# Patient Record
Sex: MALE | Race: WHITE | Employment: PART TIME | URBAN - NONMETROPOLITAN AREA
[De-identification: names, ages, dates, MRNs, and addresses within clinical notes are randomized per-mention and may not be internally consistent; named-entity substitution may affect disease eponyms.]

---

## 2017-06-12 ENCOUNTER — HOSPITAL ENCOUNTER (EMERGENCY)
Age: 27
Discharge: HOME | End: 2017-06-12
Payer: MEDICAID

## 2017-06-12 VITALS
RESPIRATION RATE: 18 BRPM | OXYGEN SATURATION: 99 % | WEIGHT: 150 LBS | HEIGHT: 72 IN | SYSTOLIC BLOOD PRESSURE: 125 MMHG | BODY MASS INDEX: 20.32 KG/M2 | DIASTOLIC BLOOD PRESSURE: 83 MMHG | HEART RATE: 107 BPM | TEMPERATURE: 98.1 F

## 2017-06-12 DIAGNOSIS — Z76.0 ENCOUNTER FOR MEDICATION REFILL: Primary | ICD-10-CM

## 2017-06-12 DIAGNOSIS — E10.9 TYPE 1 DIABETES MELLITUS WITHOUT COMPLICATION (HCC): ICD-10-CM

## 2017-06-12 PROCEDURE — 99281 EMR DPT VST MAYX REQ PHY/QHP: CPT

## 2017-06-12 PROCEDURE — 99282 EMERGENCY DEPT VISIT SF MDM: CPT | Performed by: PHYSICIAN ASSISTANT

## 2017-06-12 RX ORDER — LAMOTRIGINE 200 MG/1
200 TABLET ORAL DAILY
COMMUNITY

## 2017-06-12 RX ORDER — DEXTROAMPHETAMINE SACCHARATE, AMPHETAMINE ASPARTATE, DEXTROAMPHETAMINE SULFATE AND AMPHETAMINE SULFATE 5; 5; 5; 5 MG/1; MG/1; MG/1; MG/1
20 TABLET ORAL DAILY
COMMUNITY

## 2017-06-12 ASSESSMENT — ENCOUNTER SYMPTOMS
ABDOMINAL PAIN: 0
VOMITING: 0
BACK PAIN: 0
SHORTNESS OF BREATH: 0
NAUSEA: 0
COUGH: 0
CONSTIPATION: 0
WHEEZING: 0
DIARRHEA: 0

## 2017-08-20 ENCOUNTER — HOSPITAL ENCOUNTER (INPATIENT)
Age: 27
LOS: 1 days | Discharge: HOME | DRG: 866 | End: 2017-08-23
Attending: EMERGENCY MEDICINE | Admitting: SURGERY
Payer: MEDICAID

## 2017-08-20 ENCOUNTER — APPOINTMENT (OUTPATIENT)
Dept: CT IMAGING | Age: 27
DRG: 866 | End: 2017-08-20
Payer: MEDICAID

## 2017-08-20 DIAGNOSIS — R10.31 RLQ ABDOMINAL PAIN: Primary | ICD-10-CM

## 2017-08-20 DIAGNOSIS — E10.9 TYPE 1 DIABETES MELLITUS WITHOUT COMPLICATION (HCC): ICD-10-CM

## 2017-08-20 LAB
ALBUMIN SERPL-MCNC: 4.8 G/DL (ref 3.5–5.2)
ALP BLD-CCNC: 87 U/L (ref 40–130)
ALT SERPL-CCNC: 17 U/L (ref 5–41)
ANION GAP SERPL CALCULATED.3IONS-SCNC: 11 MMOL/L (ref 7–19)
AST SERPL-CCNC: 26 U/L (ref 5–40)
BASOPHILS ABSOLUTE: 0 K/UL (ref 0–0.2)
BASOPHILS RELATIVE PERCENT: 0.6 % (ref 0–1)
BILIRUB SERPL-MCNC: 0.5 MG/DL (ref 0.2–1.2)
BILIRUBIN URINE: NEGATIVE
BLOOD, URINE: NEGATIVE
BUN BLDV-MCNC: 11 MG/DL (ref 6–20)
CALCIUM SERPL-MCNC: 9.6 MG/DL (ref 8.6–10)
CHLORIDE BLD-SCNC: 98 MMOL/L (ref 98–111)
CLARITY: CLEAR
CO2: 31 MMOL/L (ref 22–29)
COLOR: YELLOW
CREAT SERPL-MCNC: 0.6 MG/DL (ref 0.5–1.2)
EOSINOPHILS ABSOLUTE: 0.3 K/UL (ref 0–0.6)
EOSINOPHILS RELATIVE PERCENT: 4.1 % (ref 0–5)
GFR NON-AFRICAN AMERICAN: >60
GLUCOSE BLD-MCNC: 125 MG/DL (ref 70–99)
GLUCOSE BLD-MCNC: 127 MG/DL (ref 70–99)
GLUCOSE BLD-MCNC: 128 MG/DL (ref 74–109)
GLUCOSE BLD-MCNC: 135 MG/DL
GLUCOSE BLD-MCNC: 135 MG/DL (ref 70–99)
GLUCOSE URINE: 100 MG/DL
HBA1C MFR BLD: 6.8 %
HCT VFR BLD CALC: 49.4 % (ref 42–52)
HEMOGLOBIN: 17.3 G/DL (ref 14–18)
KETONES, URINE: NEGATIVE MG/DL
LACTIC ACID: 1.2 MG/DL (ref 0.5–1.9)
LEUKOCYTE ESTERASE, URINE: NEGATIVE
LYMPHOCYTES ABSOLUTE: 2.3 K/UL (ref 1.1–4.5)
LYMPHOCYTES RELATIVE PERCENT: 37.1 % (ref 20–40)
MCH RBC QN AUTO: 31.7 PG (ref 27–31)
MCHC RBC AUTO-ENTMCNC: 35 G/DL (ref 33–37)
MCV RBC AUTO: 90.6 FL (ref 80–94)
MONOCYTES ABSOLUTE: 0.7 K/UL (ref 0–0.9)
MONOCYTES RELATIVE PERCENT: 10.5 % (ref 0–10)
NEUTROPHILS ABSOLUTE: 2.9 K/UL (ref 1.5–7.5)
NEUTROPHILS RELATIVE PERCENT: 47.7 % (ref 50–65)
NITRITE, URINE: NEGATIVE
PDW BLD-RTO: 11.9 % (ref 11.5–14.5)
PERFORMED ON: ABNORMAL
PH UA: 6
PLATELET # BLD: 209 K/UL (ref 130–400)
PMV BLD AUTO: 9.1 FL (ref 9.4–12.4)
POTASSIUM SERPL-SCNC: 4.1 MMOL/L (ref 3.5–5)
PROTEIN UA: ABNORMAL MG/DL
RBC # BLD: 5.45 M/UL (ref 4.7–6.1)
SODIUM BLD-SCNC: 140 MMOL/L (ref 136–145)
SPECIFIC GRAVITY UA: >=1.099
TOTAL PROTEIN: 7.8 G/DL (ref 6.6–8.7)
UROBILINOGEN, URINE: 1 E.U./DL
WBC # BLD: 6.2 K/UL (ref 4.8–10.8)

## 2017-08-20 PROCEDURE — 74177 CT ABD & PELVIS W/CONTRAST: CPT

## 2017-08-20 PROCEDURE — 36415 COLL VENOUS BLD VENIPUNCTURE: CPT

## 2017-08-20 PROCEDURE — 99219 PR INITIAL OBSERVATION CARE/DAY 50 MINUTES: CPT | Performed by: SURGERY

## 2017-08-20 PROCEDURE — 96375 TX/PRO/DX INJ NEW DRUG ADDON: CPT

## 2017-08-20 PROCEDURE — 2580000003 HC RX 258: Performed by: EMERGENCY MEDICINE

## 2017-08-20 PROCEDURE — 83605 ASSAY OF LACTIC ACID: CPT

## 2017-08-20 PROCEDURE — 99285 EMERGENCY DEPT VISIT HI MDM: CPT

## 2017-08-20 PROCEDURE — 85025 COMPLETE CBC W/AUTO DIFF WBC: CPT

## 2017-08-20 PROCEDURE — 96365 THER/PROPH/DIAG IV INF INIT: CPT

## 2017-08-20 PROCEDURE — G0378 HOSPITAL OBSERVATION PER HR: HCPCS

## 2017-08-20 PROCEDURE — 6360000004 HC RX CONTRAST MEDICATION: Performed by: EMERGENCY MEDICINE

## 2017-08-20 PROCEDURE — 2580000003 HC RX 258: Performed by: SURGERY

## 2017-08-20 PROCEDURE — 6360000002 HC RX W HCPCS: Performed by: SURGERY

## 2017-08-20 PROCEDURE — 6360000002 HC RX W HCPCS: Performed by: EMERGENCY MEDICINE

## 2017-08-20 PROCEDURE — 83036 HEMOGLOBIN GLYCOSYLATED A1C: CPT

## 2017-08-20 PROCEDURE — 81003 URINALYSIS AUTO W/O SCOPE: CPT

## 2017-08-20 PROCEDURE — 80053 COMPREHEN METABOLIC PANEL: CPT

## 2017-08-20 PROCEDURE — 99284 EMERGENCY DEPT VISIT MOD MDM: CPT | Performed by: EMERGENCY MEDICINE

## 2017-08-20 PROCEDURE — 82948 REAGENT STRIP/BLOOD GLUCOSE: CPT

## 2017-08-20 RX ORDER — SODIUM CHLORIDE 9 MG/ML
INJECTION, SOLUTION INTRAVENOUS CONTINUOUS
Status: DISCONTINUED | OUTPATIENT
Start: 2017-08-20 | End: 2017-08-20

## 2017-08-20 RX ORDER — SODIUM CHLORIDE 9 MG/ML
INJECTION, SOLUTION INTRAVENOUS ONCE
Status: DISCONTINUED | OUTPATIENT
Start: 2017-08-20 | End: 2017-08-20

## 2017-08-20 RX ORDER — SODIUM CHLORIDE 0.9 % (FLUSH) 0.9 %
10 SYRINGE (ML) INJECTION EVERY 12 HOURS SCHEDULED
Status: DISCONTINUED | OUTPATIENT
Start: 2017-08-20 | End: 2017-08-23 | Stop reason: HOSPADM

## 2017-08-20 RX ORDER — SODIUM CHLORIDE, SODIUM LACTATE, POTASSIUM CHLORIDE, CALCIUM CHLORIDE 600; 310; 30; 20 MG/100ML; MG/100ML; MG/100ML; MG/100ML
INJECTION, SOLUTION INTRAVENOUS CONTINUOUS
Status: DISCONTINUED | OUTPATIENT
Start: 2017-08-20 | End: 2017-08-23 | Stop reason: HOSPADM

## 2017-08-20 RX ORDER — ACETAMINOPHEN 325 MG/1
650 TABLET ORAL EVERY 4 HOURS PRN
Status: DISCONTINUED | OUTPATIENT
Start: 2017-08-20 | End: 2017-08-23 | Stop reason: HOSPADM

## 2017-08-20 RX ORDER — KETOROLAC TROMETHAMINE 30 MG/ML
30 INJECTION, SOLUTION INTRAMUSCULAR; INTRAVENOUS ONCE
Status: COMPLETED | OUTPATIENT
Start: 2017-08-20 | End: 2017-08-20

## 2017-08-20 RX ORDER — DEXTROSE MONOHYDRATE 50 MG/ML
100 INJECTION, SOLUTION INTRAVENOUS PRN
Status: DISCONTINUED | OUTPATIENT
Start: 2017-08-20 | End: 2017-08-23 | Stop reason: HOSPADM

## 2017-08-20 RX ORDER — ONDANSETRON 2 MG/ML
4 INJECTION INTRAMUSCULAR; INTRAVENOUS EVERY 6 HOURS PRN
Status: DISCONTINUED | OUTPATIENT
Start: 2017-08-20 | End: 2017-08-23 | Stop reason: HOSPADM

## 2017-08-20 RX ORDER — NICOTINE POLACRILEX 4 MG
15 LOZENGE BUCCAL PRN
Status: DISCONTINUED | OUTPATIENT
Start: 2017-08-20 | End: 2017-08-23 | Stop reason: HOSPADM

## 2017-08-20 RX ORDER — MORPHINE SULFATE 4 MG/ML
2 INJECTION, SOLUTION INTRAMUSCULAR; INTRAVENOUS EVERY 30 MIN PRN
Status: DISCONTINUED | OUTPATIENT
Start: 2017-08-20 | End: 2017-08-23 | Stop reason: HOSPADM

## 2017-08-20 RX ORDER — DEXTROSE MONOHYDRATE 25 G/50ML
12.5 INJECTION, SOLUTION INTRAVENOUS PRN
Status: DISCONTINUED | OUTPATIENT
Start: 2017-08-20 | End: 2017-08-23 | Stop reason: HOSPADM

## 2017-08-20 RX ORDER — ONDANSETRON 2 MG/ML
4 INJECTION INTRAMUSCULAR; INTRAVENOUS ONCE
Status: COMPLETED | OUTPATIENT
Start: 2017-08-20 | End: 2017-08-20

## 2017-08-20 RX ORDER — SODIUM CHLORIDE 9 MG/ML
INJECTION, SOLUTION INTRAVENOUS CONTINUOUS
Status: DISCONTINUED | OUTPATIENT
Start: 2017-08-20 | End: 2017-08-23 | Stop reason: HOSPADM

## 2017-08-20 RX ORDER — DEXTROAMPHETAMINE SACCHARATE, AMPHETAMINE ASPARTATE, DEXTROAMPHETAMINE SULFATE AND AMPHETAMINE SULFATE 2.5; 2.5; 2.5; 2.5 MG/1; MG/1; MG/1; MG/1
20 TABLET ORAL DAILY
Status: DISCONTINUED | OUTPATIENT
Start: 2017-08-21 | End: 2017-08-23 | Stop reason: HOSPADM

## 2017-08-20 RX ORDER — LAMOTRIGINE 200 MG/1
200 TABLET ORAL DAILY
Status: DISCONTINUED | OUTPATIENT
Start: 2017-08-21 | End: 2017-08-23 | Stop reason: HOSPADM

## 2017-08-20 RX ORDER — SODIUM CHLORIDE 0.9 % (FLUSH) 0.9 %
10 SYRINGE (ML) INJECTION PRN
Status: DISCONTINUED | OUTPATIENT
Start: 2017-08-20 | End: 2017-08-23 | Stop reason: HOSPADM

## 2017-08-20 RX ADMIN — SODIUM CHLORIDE 1000 MG: 9 INJECTION, SOLUTION INTRAVENOUS at 20:59

## 2017-08-20 RX ADMIN — SODIUM CHLORIDE: 9 INJECTION, SOLUTION INTRAVENOUS at 16:24

## 2017-08-20 RX ADMIN — IOVERSOL 90 ML: 741 INJECTION INTRA-ARTERIAL; INTRAVENOUS at 17:00

## 2017-08-20 RX ADMIN — KETOROLAC TROMETHAMINE 30 MG: 30 INJECTION, SOLUTION INTRAMUSCULAR at 17:10

## 2017-08-20 RX ADMIN — SODIUM CHLORIDE: 9 INJECTION, SOLUTION INTRAVENOUS at 19:57

## 2017-08-20 RX ADMIN — ONDANSETRON 4 MG: 2 INJECTION INTRAMUSCULAR; INTRAVENOUS at 17:10

## 2017-08-20 RX ADMIN — HYDROMORPHONE HYDROCHLORIDE 1 MG: 1 INJECTION, SOLUTION INTRAMUSCULAR; INTRAVENOUS; SUBCUTANEOUS at 21:55

## 2017-08-20 RX ADMIN — Medication 10 ML: at 19:58

## 2017-08-20 ASSESSMENT — ENCOUNTER SYMPTOMS
HEMATOCHEZIA: 1
DIARRHEA: 0
VOMITING: 1
ABDOMINAL PAIN: 1
NAUSEA: 1
CONSTIPATION: 0

## 2017-08-20 ASSESSMENT — PAIN SCALES - GENERAL
PAINLEVEL_OUTOF10: 1
PAINLEVEL_OUTOF10: 7
PAINLEVEL_OUTOF10: 6

## 2017-08-20 ASSESSMENT — PAIN DESCRIPTION - LOCATION: LOCATION: ABDOMEN

## 2017-08-20 ASSESSMENT — PAIN DESCRIPTION - DESCRIPTORS: DESCRIPTORS: ACHING

## 2017-08-21 LAB
BASOPHILS ABSOLUTE: 0.1 K/UL (ref 0–0.2)
BASOPHILS RELATIVE PERCENT: 0.9 % (ref 0–1)
EOSINOPHILS ABSOLUTE: 0.3 K/UL (ref 0–0.6)
EOSINOPHILS RELATIVE PERCENT: 5.3 % (ref 0–5)
GLUCOSE BLD-MCNC: 101 MG/DL (ref 70–99)
GLUCOSE BLD-MCNC: 118 MG/DL (ref 70–99)
GLUCOSE BLD-MCNC: 146 MG/DL (ref 70–99)
GLUCOSE BLD-MCNC: 146 MG/DL (ref 70–99)
GLUCOSE BLD-MCNC: 85 MG/DL (ref 70–99)
HCT VFR BLD CALC: 41.5 % (ref 42–52)
HEMOGLOBIN: 14.1 G/DL (ref 14–18)
LYMPHOCYTES ABSOLUTE: 2.8 K/UL (ref 1.1–4.5)
LYMPHOCYTES RELATIVE PERCENT: 53.4 % (ref 20–40)
MCH RBC QN AUTO: 31.3 PG (ref 27–31)
MCHC RBC AUTO-ENTMCNC: 34 G/DL (ref 33–37)
MCV RBC AUTO: 92 FL (ref 80–94)
MONOCYTES ABSOLUTE: 0.7 K/UL (ref 0–0.9)
MONOCYTES RELATIVE PERCENT: 13.4 % (ref 0–10)
NEUTROPHILS ABSOLUTE: 1.4 K/UL (ref 1.5–7.5)
NEUTROPHILS RELATIVE PERCENT: 26.8 % (ref 50–65)
PDW BLD-RTO: 11.9 % (ref 11.5–14.5)
PERFORMED ON: ABNORMAL
PERFORMED ON: NORMAL
PLATELET # BLD: 197 K/UL (ref 130–400)
PMV BLD AUTO: 9.5 FL (ref 9.4–12.4)
RBC # BLD: 4.51 M/UL (ref 4.7–6.1)
WBC # BLD: 5.3 K/UL (ref 4.8–10.8)

## 2017-08-21 PROCEDURE — 2580000003 HC RX 258: Performed by: SURGERY

## 2017-08-21 PROCEDURE — G0378 HOSPITAL OBSERVATION PER HR: HCPCS

## 2017-08-21 PROCEDURE — 36415 COLL VENOUS BLD VENIPUNCTURE: CPT

## 2017-08-21 PROCEDURE — 82948 REAGENT STRIP/BLOOD GLUCOSE: CPT

## 2017-08-21 PROCEDURE — 6360000002 HC RX W HCPCS: Performed by: SURGERY

## 2017-08-21 PROCEDURE — 6370000000 HC RX 637 (ALT 250 FOR IP): Performed by: SURGERY

## 2017-08-21 PROCEDURE — 99224 PR SBSQ OBSERVATION CARE/DAY 15 MINUTES: CPT | Performed by: SURGERY

## 2017-08-21 PROCEDURE — 85025 COMPLETE CBC W/AUTO DIFF WBC: CPT

## 2017-08-21 PROCEDURE — 96376 TX/PRO/DX INJ SAME DRUG ADON: CPT

## 2017-08-21 PROCEDURE — 96366 THER/PROPH/DIAG IV INF ADDON: CPT

## 2017-08-21 RX ADMIN — HYDROMORPHONE HYDROCHLORIDE 1 MG: 1 INJECTION, SOLUTION INTRAMUSCULAR; INTRAVENOUS; SUBCUTANEOUS at 08:45

## 2017-08-21 RX ADMIN — HYDROMORPHONE HYDROCHLORIDE 1 MG: 1 INJECTION, SOLUTION INTRAMUSCULAR; INTRAVENOUS; SUBCUTANEOUS at 18:50

## 2017-08-21 RX ADMIN — SODIUM CHLORIDE: 9 INJECTION, SOLUTION INTRAVENOUS at 04:10

## 2017-08-21 RX ADMIN — ACETAMINOPHEN 650 MG: 325 TABLET, FILM COATED ORAL at 22:13

## 2017-08-21 RX ADMIN — SODIUM CHLORIDE 1000 MG: 9 INJECTION, SOLUTION INTRAVENOUS at 22:03

## 2017-08-21 RX ADMIN — HYDROMORPHONE HYDROCHLORIDE 1 MG: 1 INJECTION, SOLUTION INTRAMUSCULAR; INTRAVENOUS; SUBCUTANEOUS at 04:10

## 2017-08-21 RX ADMIN — HYDROMORPHONE HYDROCHLORIDE 1 MG: 1 INJECTION, SOLUTION INTRAMUSCULAR; INTRAVENOUS; SUBCUTANEOUS at 13:03

## 2017-08-21 RX ADMIN — ONDANSETRON 4 MG: 2 INJECTION INTRAMUSCULAR; INTRAVENOUS at 08:45

## 2017-08-21 RX ADMIN — LAMOTRIGINE 200 MG: 200 TABLET ORAL at 08:54

## 2017-08-21 RX ADMIN — INSULIN LISPRO 2 UNITS: 100 INJECTION, SOLUTION INTRAVENOUS; SUBCUTANEOUS at 08:54

## 2017-08-21 RX ADMIN — DEXTROAMPHETAMINE SACCHARATE, AMPHETAMINE ASPARTATE, DEXTROAMPHETAMINE SULFATE AND AMPHETAMINE SULFATE 20 MG: 2.5; 2.5; 2.5; 2.5 TABLET ORAL at 08:54

## 2017-08-21 ASSESSMENT — PAIN SCALES - GENERAL
PAINLEVEL_OUTOF10: 9
PAINLEVEL_OUTOF10: 7
PAINLEVEL_OUTOF10: 8
PAINLEVEL_OUTOF10: 7
PAINLEVEL_OUTOF10: 5
PAINLEVEL_OUTOF10: 7
PAINLEVEL_OUTOF10: 6
PAINLEVEL_OUTOF10: 6
PAINLEVEL_OUTOF10: 5
PAINLEVEL_OUTOF10: 2

## 2017-08-22 ENCOUNTER — APPOINTMENT (OUTPATIENT)
Dept: GENERAL RADIOLOGY | Age: 27
DRG: 866 | End: 2017-08-22
Payer: MEDICAID

## 2017-08-22 LAB
GLUCOSE BLD-MCNC: 105 MG/DL (ref 70–99)
GLUCOSE BLD-MCNC: 109 MG/DL (ref 70–99)
GLUCOSE BLD-MCNC: 73 MG/DL (ref 70–99)
GLUCOSE BLD-MCNC: 79 MG/DL (ref 70–99)
GLUCOSE BLD-MCNC: 85 MG/DL (ref 70–99)
GLUCOSE BLD-MCNC: 94 MG/DL (ref 70–99)
GLUCOSE BLD-MCNC: 95 MG/DL (ref 70–99)
PERFORMED ON: ABNORMAL
PERFORMED ON: ABNORMAL
PERFORMED ON: NORMAL

## 2017-08-22 PROCEDURE — 6360000002 HC RX W HCPCS: Performed by: SURGERY

## 2017-08-22 PROCEDURE — 96366 THER/PROPH/DIAG IV INF ADDON: CPT

## 2017-08-22 PROCEDURE — 99224 PR SBSQ OBSERVATION CARE/DAY 15 MINUTES: CPT | Performed by: SURGERY

## 2017-08-22 PROCEDURE — 96376 TX/PRO/DX INJ SAME DRUG ADON: CPT

## 2017-08-22 PROCEDURE — 2580000003 HC RX 258: Performed by: SURGERY

## 2017-08-22 PROCEDURE — 74241 FL UGI WITH KUB: CPT

## 2017-08-22 PROCEDURE — 96375 TX/PRO/DX INJ NEW DRUG ADDON: CPT

## 2017-08-22 PROCEDURE — 82948 REAGENT STRIP/BLOOD GLUCOSE: CPT

## 2017-08-22 PROCEDURE — G0378 HOSPITAL OBSERVATION PER HR: HCPCS

## 2017-08-22 PROCEDURE — 6360000002 HC RX W HCPCS: Performed by: PHYSICIAN ASSISTANT

## 2017-08-22 RX ORDER — METOCLOPRAMIDE HYDROCHLORIDE 5 MG/ML
10 INJECTION INTRAMUSCULAR; INTRAVENOUS
Status: DISCONTINUED | OUTPATIENT
Start: 2017-08-22 | End: 2017-08-23 | Stop reason: HOSPADM

## 2017-08-22 RX ADMIN — HYDROMORPHONE HYDROCHLORIDE 1 MG: 1 INJECTION, SOLUTION INTRAMUSCULAR; INTRAVENOUS; SUBCUTANEOUS at 06:08

## 2017-08-22 RX ADMIN — HYDROMORPHONE HYDROCHLORIDE 1 MG: 1 INJECTION, SOLUTION INTRAMUSCULAR; INTRAVENOUS; SUBCUTANEOUS at 11:49

## 2017-08-22 RX ADMIN — METOCLOPRAMIDE 10 MG: 5 INJECTION, SOLUTION INTRAMUSCULAR; INTRAVENOUS at 17:10

## 2017-08-22 RX ADMIN — SODIUM CHLORIDE 1000 MG: 9 INJECTION, SOLUTION INTRAVENOUS at 21:15

## 2017-08-22 RX ADMIN — METOCLOPRAMIDE 10 MG: 5 INJECTION, SOLUTION INTRAMUSCULAR; INTRAVENOUS at 11:49

## 2017-08-22 ASSESSMENT — PAIN SCALES - GENERAL
PAINLEVEL_OUTOF10: 7
PAINLEVEL_OUTOF10: 8

## 2017-08-23 ENCOUNTER — APPOINTMENT (OUTPATIENT)
Dept: CT IMAGING | Age: 27
DRG: 866 | End: 2017-08-23
Payer: MEDICAID

## 2017-08-23 VITALS
RESPIRATION RATE: 18 BRPM | DIASTOLIC BLOOD PRESSURE: 57 MMHG | BODY MASS INDEX: 20.32 KG/M2 | TEMPERATURE: 97.2 F | HEIGHT: 72 IN | OXYGEN SATURATION: 99 % | HEART RATE: 74 BPM | WEIGHT: 150 LBS | SYSTOLIC BLOOD PRESSURE: 104 MMHG

## 2017-08-23 LAB
BASOPHILS ABSOLUTE: 0 K/UL (ref 0–0.2)
BASOPHILS RELATIVE PERCENT: 0.3 % (ref 0–1)
EOSINOPHILS ABSOLUTE: 0.2 K/UL (ref 0–0.6)
EOSINOPHILS RELATIVE PERCENT: 4.1 % (ref 0–5)
GLUCOSE BLD-MCNC: 173 MG/DL (ref 70–99)
GLUCOSE BLD-MCNC: 175 MG/DL (ref 70–99)
GLUCOSE BLD-MCNC: 185 MG/DL (ref 70–99)
GLUCOSE BLD-MCNC: 213 MG/DL (ref 70–99)
GLUCOSE BLD-MCNC: 58 MG/DL (ref 70–99)
GLUCOSE BLD-MCNC: 62 MG/DL (ref 70–99)
GLUCOSE BLD-MCNC: 91 MG/DL (ref 70–99)
HCT VFR BLD CALC: 38.2 % (ref 42–52)
HEMOGLOBIN: 13.9 G/DL (ref 14–18)
LYMPHOCYTES ABSOLUTE: 3 K/UL (ref 1.1–4.5)
LYMPHOCYTES RELATIVE PERCENT: 50.3 % (ref 20–40)
MCH RBC QN AUTO: 31.6 PG (ref 27–31)
MCHC RBC AUTO-ENTMCNC: 36.4 G/DL (ref 33–37)
MCV RBC AUTO: 86.8 FL (ref 80–94)
MONOCYTES ABSOLUTE: 0.7 K/UL (ref 0–0.9)
MONOCYTES RELATIVE PERCENT: 12.2 % (ref 0–10)
NEUTROPHILS ABSOLUTE: 1.9 K/UL (ref 1.5–7.5)
NEUTROPHILS RELATIVE PERCENT: 32.9 % (ref 50–65)
PDW BLD-RTO: 11.3 % (ref 11.5–14.5)
PERFORMED ON: ABNORMAL
PERFORMED ON: NORMAL
PLATELET # BLD: 187 K/UL (ref 130–400)
PMV BLD AUTO: 9.4 FL (ref 9.4–12.4)
RBC # BLD: 4.4 M/UL (ref 4.7–6.1)
WBC # BLD: 5.9 K/UL (ref 4.8–10.8)

## 2017-08-23 PROCEDURE — 1210000000 HC MED SURG R&B

## 2017-08-23 PROCEDURE — 82948 REAGENT STRIP/BLOOD GLUCOSE: CPT

## 2017-08-23 PROCEDURE — 85025 COMPLETE CBC W/AUTO DIFF WBC: CPT

## 2017-08-23 PROCEDURE — 2580000003 HC RX 258: Performed by: SURGERY

## 2017-08-23 PROCEDURE — 6360000002 HC RX W HCPCS: Performed by: PHYSICIAN ASSISTANT

## 2017-08-23 PROCEDURE — 99217 PR OBSERVATION CARE DISCHARGE MANAGEMENT: CPT | Performed by: SURGERY

## 2017-08-23 PROCEDURE — 96376 TX/PRO/DX INJ SAME DRUG ADON: CPT

## 2017-08-23 PROCEDURE — 6360000002 HC RX W HCPCS: Performed by: SURGERY

## 2017-08-23 PROCEDURE — 36415 COLL VENOUS BLD VENIPUNCTURE: CPT

## 2017-08-23 PROCEDURE — 6370000000 HC RX 637 (ALT 250 FOR IP): Performed by: SURGERY

## 2017-08-23 RX ORDER — METOCLOPRAMIDE 10 MG/1
10 TABLET ORAL
Qty: 120 TABLET | Refills: 3 | Status: SHIPPED | OUTPATIENT
Start: 2017-08-23

## 2017-08-23 RX ADMIN — METOCLOPRAMIDE 10 MG: 5 INJECTION, SOLUTION INTRAMUSCULAR; INTRAVENOUS at 06:01

## 2017-08-23 RX ADMIN — DEXTROAMPHETAMINE SACCHARATE, AMPHETAMINE ASPARTATE, DEXTROAMPHETAMINE SULFATE AND AMPHETAMINE SULFATE 20 MG: 2.5; 2.5; 2.5; 2.5 TABLET ORAL at 10:48

## 2017-08-23 RX ADMIN — LAMOTRIGINE 200 MG: 200 TABLET ORAL at 10:48

## 2017-08-23 RX ADMIN — METOCLOPRAMIDE 10 MG: 5 INJECTION, SOLUTION INTRAMUSCULAR; INTRAVENOUS at 12:04

## 2017-08-23 RX ADMIN — DEXTROSE 15 G: 15 GEL ORAL at 03:33

## 2017-08-23 RX ADMIN — SODIUM CHLORIDE: 9 INJECTION, SOLUTION INTRAVENOUS at 10:49

## 2017-08-23 RX ADMIN — HYDROMORPHONE HYDROCHLORIDE 1 MG: 1 INJECTION, SOLUTION INTRAMUSCULAR; INTRAVENOUS; SUBCUTANEOUS at 10:55

## 2017-08-23 ASSESSMENT — PAIN SCALES - GENERAL
PAINLEVEL_OUTOF10: 3
PAINLEVEL_OUTOF10: 7

## 2017-08-28 ENCOUNTER — TELEPHONE (OUTPATIENT)
Dept: SURGERY | Age: 27
End: 2017-08-28

## 2017-08-28 NOTE — TELEPHONE ENCOUNTER
pt's mother advises it's between 7 & 8 a.m. she said if he could not meet with him if you could please call her at    (Copied from [employer] message - I'll text  [facility] too - just an fyi. ..)  Thank you!!

## 2018-01-23 ENCOUNTER — APPOINTMENT (OUTPATIENT)
Dept: LAB | Facility: HOSPITAL | Age: 28
End: 2018-01-23

## 2018-01-23 ENCOUNTER — TRANSCRIBE ORDERS (OUTPATIENT)
Dept: ADMINISTRATIVE | Facility: HOSPITAL | Age: 28
End: 2018-01-23

## 2018-01-23 DIAGNOSIS — R50.9 FEVER, UNSPECIFIED FEVER CAUSE: Primary | ICD-10-CM

## 2018-01-23 LAB
ACETONE BLD QL: ABNORMAL
ALBUMIN SERPL-MCNC: 4.9 G/DL (ref 3.5–5)
ALBUMIN/GLOB SERPL: 1.7 G/DL (ref 1.1–2.5)
ALP SERPL-CCNC: 121 U/L (ref 24–120)
ALT SERPL W P-5'-P-CCNC: 36 U/L (ref 0–54)
ANION GAP SERPL CALCULATED.3IONS-SCNC: 17 MMOL/L (ref 4–13)
AST SERPL-CCNC: 38 U/L (ref 7–45)
BASOPHILS # BLD AUTO: 0.03 10*3/MM3 (ref 0–0.2)
BASOPHILS NFR BLD AUTO: 0.3 % (ref 0–2)
BILIRUB SERPL-MCNC: 1 MG/DL (ref 0.1–1)
BUN BLD-MCNC: 12 MG/DL (ref 5–21)
BUN/CREAT SERPL: 16.9 (ref 7–25)
CALCIUM SPEC-SCNC: 9.5 MG/DL (ref 8.4–10.4)
CHLORIDE SERPL-SCNC: 96 MMOL/L (ref 98–110)
CO2 SERPL-SCNC: 25 MMOL/L (ref 24–31)
CREAT BLD-MCNC: 0.71 MG/DL (ref 0.5–1.4)
DEPRECATED RDW RBC AUTO: 38.3 FL (ref 40–54)
EOSINOPHIL # BLD AUTO: 0.03 10*3/MM3 (ref 0–0.7)
EOSINOPHIL NFR BLD AUTO: 0.3 % (ref 0–4)
ERYTHROCYTE [DISTWIDTH] IN BLOOD BY AUTOMATED COUNT: 12.2 % (ref 12–15)
GFR SERPL CREATININE-BSD FRML MDRD: 133 ML/MIN/1.73
GLOBULIN UR ELPH-MCNC: 2.9 GM/DL
GLUCOSE BLD-MCNC: 200 MG/DL (ref 70–100)
HCT VFR BLD AUTO: 44.6 % (ref 40–52)
HGB BLD-MCNC: 15.8 G/DL (ref 14–18)
IMM GRANULOCYTES # BLD: 0.01 10*3/MM3 (ref 0–0.03)
IMM GRANULOCYTES NFR BLD: 0.1 % (ref 0–5)
LYMPHOCYTES # BLD AUTO: 3.39 10*3/MM3 (ref 0.72–4.86)
LYMPHOCYTES NFR BLD AUTO: 35.8 % (ref 15–45)
MCH RBC QN AUTO: 30.6 PG (ref 28–32)
MCHC RBC AUTO-ENTMCNC: 35.4 G/DL (ref 33–36)
MCV RBC AUTO: 86.3 FL (ref 82–95)
MONOCYTES # BLD AUTO: 0.83 10*3/MM3 (ref 0.19–1.3)
MONOCYTES NFR BLD AUTO: 8.8 % (ref 4–12)
NEUTROPHILS # BLD AUTO: 5.19 10*3/MM3 (ref 1.87–8.4)
NEUTROPHILS NFR BLD AUTO: 54.7 % (ref 39–78)
NRBC BLD MANUAL-RTO: 0 /100 WBC (ref 0–0)
PLATELET # BLD AUTO: 310 10*3/MM3 (ref 130–400)
PMV BLD AUTO: 9.4 FL (ref 6–12)
POTASSIUM BLD-SCNC: 4 MMOL/L (ref 3.5–5.3)
PROT SERPL-MCNC: 7.8 G/DL (ref 6.3–8.7)
RBC # BLD AUTO: 5.17 10*6/MM3 (ref 4.8–5.9)
SODIUM BLD-SCNC: 138 MMOL/L (ref 135–145)
WBC NRBC COR # BLD: 9.48 10*3/MM3 (ref 4.8–10.8)

## 2018-01-23 PROCEDURE — 80053 COMPREHEN METABOLIC PANEL: CPT | Performed by: NURSE PRACTITIONER

## 2018-01-23 PROCEDURE — 36415 COLL VENOUS BLD VENIPUNCTURE: CPT | Performed by: NURSE PRACTITIONER

## 2018-01-23 PROCEDURE — 85025 COMPLETE CBC W/AUTO DIFF WBC: CPT | Performed by: NURSE PRACTITIONER

## 2018-01-23 PROCEDURE — 82009 KETONE BODYS QUAL: CPT | Performed by: NURSE PRACTITIONER

## 2018-12-07 ENCOUNTER — OFFICE VISIT (OUTPATIENT)
Dept: ENDOCRINOLOGY | Facility: CLINIC | Age: 28
End: 2018-12-07

## 2018-12-07 VITALS
OXYGEN SATURATION: 100 % | WEIGHT: 160.6 LBS | BODY MASS INDEX: 21.75 KG/M2 | HEART RATE: 66 BPM | HEIGHT: 72 IN | SYSTOLIC BLOOD PRESSURE: 122 MMHG | DIASTOLIC BLOOD PRESSURE: 70 MMHG

## 2018-12-07 DIAGNOSIS — E53.8 B12 DEFICIENCY: ICD-10-CM

## 2018-12-07 DIAGNOSIS — E55.9 VITAMIN D DEFICIENCY: ICD-10-CM

## 2018-12-07 DIAGNOSIS — E10.649 TYPE 1 DIABETES MELLITUS WITH HYPOGLYCEMIA UNAWARENESS (HCC): Primary | ICD-10-CM

## 2018-12-07 PROBLEM — F31.9 BIPOLAR DEPRESSION (HCC): Status: ACTIVE | Noted: 2018-12-07

## 2018-12-07 PROCEDURE — 99204 OFFICE O/P NEW MOD 45 MIN: CPT | Performed by: INTERNAL MEDICINE

## 2018-12-07 RX ORDER — DEXTROAMPHETAMINE SACCHARATE, AMPHETAMINE ASPARTATE, DEXTROAMPHETAMINE SULFATE AND AMPHETAMINE SULFATE 7.5; 7.5; 7.5; 7.5 MG/1; MG/1; MG/1; MG/1
30 TABLET ORAL 2 TIMES DAILY
COMMUNITY

## 2018-12-07 RX ORDER — LAMOTRIGINE 200 MG/1
200 TABLET ORAL DAILY
COMMUNITY

## 2018-12-07 RX ORDER — GABAPENTIN 100 MG/1
200 CAPSULE ORAL 2 TIMES DAILY
COMMUNITY
End: 2021-08-30

## 2018-12-07 RX ORDER — INSULIN GLARGINE 100 [IU]/ML
30 INJECTION, SOLUTION SUBCUTANEOUS NIGHTLY
COMMUNITY
End: 2021-08-30

## 2018-12-07 RX ORDER — CLONAZEPAM 0.5 MG/1
0.5 TABLET ORAL 2 TIMES DAILY PRN
COMMUNITY

## 2018-12-07 NOTE — PROGRESS NOTES
" Jesse Woodson is a 28 y.o. male who presents for  evaluation of   Chief Complaint   Patient presents with   • Diabetes     bs 185       Referring provider    Abhi Alejo DO  [address] [place] [address]    Primary Care Provider    Abhi Alejo DO    Duration since age 8 years    Timing - Diabetes is Constant    Quality -  needs improvement    Severity -  severe    Complications - peripheral vascular disease    Current symptoms/problems DKA and Hypoglycemia Unawareness    Alleviating Factors: Compliance       Current diet  in general, a \"healthy\" diet      Current exercise walking    Current monitoring regimen: home blood tests - checking 4 x daily   Home blood sugar records:     Hypoglycemia unawareness, nocturnal, exertional , postprandial, if skipped meals , has had syncope  and has had ambulance and/or ER visits    Past Medical History:   Diagnosis Date   • Bipolar depression (CMS/[facility]) 12/7/2018   • Type 1 diabetes mellitus with hypoglycemia unawareness (CMS/[facility]) 12/7/2018     Family History   Problem Relation Age of Onset   • Diabetes type II Maternal Grandmother      Social History     Tobacco Use   • Smoking status: Current Some Day Smoker   • Smokeless tobacco: Never Used   Substance Use Topics   • Alcohol use: No     Frequency: Never   • Drug use: No         Current Outpatient Medications:   •  amphetamine-dextroamphetamine (ADDERALL) 20 MG tablet, Take 20 mg by mouth Daily., Disp: , Rfl:   •  clonazePAM (KlonoPIN) 0.5 MG tablet, Take 0.5 mg by mouth 2 (Two) Times a Day As Needed for Seizures., Disp: , Rfl:   •  gabapentin (NEURONTIN) 100 MG capsule, Take 200 mg by mouth 2 (Two) Times a Day., Disp: , Rfl:   •  Insulin Glargine (BASAGLAR KWIKPEN) 100 UNIT/ML injection pen, Inject 30 Units under the skin into the appropriate area as directed Every Night., Disp: , Rfl:   •  Insulin Lispro (HUMALOG KWIKPEN) 100 UNIT/ML solution pen-injector, Inject  under the skin into the " "appropriate area as directed. Up to 20 units per meal 3 times daily, Disp: , Rfl:   •  lamoTRIgine (LaMICtal) 100 MG tablet, Take 200 mg by mouth Daily., Disp: , Rfl:     Review of Systems    Review of Systems   Constitutional: Negative for activity change, appetite change, chills, diaphoresis, fatigue, fever and unexpected weight change.   HENT: Negative for congestion, dental problem, drooling, ear discharge, ear pain, facial swelling, mouth sores, postnasal drip, rhinorrhea, sinus pressure, sore throat, tinnitus, trouble swallowing and voice change.    Eyes: Negative for photophobia, pain, discharge, redness, itching and visual disturbance.   Respiratory: Negative for apnea, cough, choking, chest tightness, shortness of breath, wheezing and stridor.    Cardiovascular: Negative for chest pain, palpitations and leg swelling.   Gastrointestinal: Negative for abdominal distention, abdominal pain, constipation, diarrhea, nausea and vomiting.   Endocrine: Negative for cold intolerance, heat intolerance, polydipsia, polyphagia and polyuria.   Genitourinary: Negative for decreased urine volume, difficulty urinating, dysuria, flank pain, frequency, hematuria and urgency.   Musculoskeletal: Negative for arthralgias, back pain, gait problem, joint swelling, myalgias, neck pain and neck stiffness.   Skin: Negative for color change, pallor, rash and wound.   Allergic/Immunologic: Negative for immunocompromised state.   Neurological: Negative for dizziness, tremors, seizures, syncope, facial asymmetry, speech difficulty, weakness, light-headedness, numbness and headaches.   Hematological: Negative for adenopathy.   Psychiatric/Behavioral: Negative for agitation, behavioral problems, confusion, decreased concentration, dysphoric mood, hallucinations, self-injury, sleep disturbance and suicidal ideas. The patient is not nervous/anxious and is not hyperactive.         Objective:   /70   Pulse 66   Ht 182.9 cm (72\")   Wt " 72.8 kg (160 lb 9.6 oz)   SpO2 100%   BMI 21.78 kg/m²     Physical Exam   Constitutional: He is oriented to person, place, and time. He appears well-developed and well-nourished. He is cooperative.   HENT:   Head: Normocephalic and atraumatic.   Right Ear: External ear normal.   Left Ear: External ear normal.   Nose: Nose normal.   Mouth/Throat: Oropharynx is clear and moist. No oropharyngeal exudate.   Eyes: Conjunctivae and EOM are normal. Pupils are equal, round, and reactive to light. No scleral icterus. Right eye exhibits normal extraocular motion. Left eye exhibits normal extraocular motion.   Neck: Neck supple. No JVD present. No muscular tenderness present. No tracheal deviation, no edema and no erythema present. No thyromegaly present.   Cardiovascular: Normal rate, regular rhythm, normal heart sounds and intact distal pulses. Exam reveals no gallop and no friction rub.   No murmur heard.  Pulmonary/Chest: Effort normal and breath sounds normal. No stridor. No respiratory distress. He has no decreased breath sounds. He has no wheezes. He has no rhonchi. He has no rales. He exhibits no tenderness.   Abdominal: Soft. Bowel sounds are normal. He exhibits no distension and no mass. There is no hepatomegaly. There is no tenderness. There is no rebound and no guarding. No hernia.   Musculoskeletal: Normal range of motion. He exhibits no edema, tenderness or deformity.   Lymphadenopathy:     He has no cervical adenopathy.   Neurological: He is alert and oriented to person, place, and time. He has normal reflexes. No cranial nerve deficit. He exhibits normal muscle tone. Coordination normal.   Skin: Skin is warm. No rash noted. No erythema. No pallor.   Psychiatric: He has a normal mood and affect. His behavior is normal. Judgment and thought content normal.   Nursing note and vitals reviewed.      Lab Review    Results for orders placed or performed in visit on 12/07/18   CBC Auto Differential   Result Value Ref  Range    WBC 5.70 4.80 - 10.80 10*3/mm3    RBC 5.33 4.80 - 5.90 10*6/mm3    Hemoglobin 16.1 14.0 - 18.0 g/dL    Hematocrit 44.8 40.0 - 52.0 %    MCV 84.1 82.0 - 95.0 fL    MCH 30.2 28.0 - 32.0 pg    MCHC 35.9 33.0 - 36.0 g/dL    RDW 11.9 (L) 12.0 - 15.0 %    RDW-SD 35.9 (L) 40.0 - 54.0 fl    MPV 9.7 6.0 - 12.0 fL    Platelets 248 130 - 400 10*3/mm3    Neutrophil % 39.9 39.0 - 78.0 %    Lymphocyte % 49.1 (H) 15.0 - 45.0 %    Monocyte % 8.9 4.0 - 12.0 %    Eosinophil % 1.2 0.0 - 4.0 %    Basophil % 0.7 0.0 - 2.0 %    Immature Grans % 0.2 0.0 - 5.0 %    Neutrophils, Absolute 2.27 1.87 - 8.40 10*3/mm3    Lymphocytes, Absolute 2.80 0.72 - 4.86 10*3/mm3    Monocytes, Absolute 0.51 0.19 - 1.30 10*3/mm3    Eosinophils, Absolute 0.07 0.00 - 0.70 10*3/mm3    Basophils, Absolute 0.04 0.00 - 0.20 10*3/mm3    Immature Grans, Absolute 0.01 0.00 - 0.03 10*3/mm3    nRBC 0.0 0.0 - 0.0 /100 WBC   Celiac Panel Reflex To Titer   Result Value Ref Range    Gliadin Deamidated Peptide Ab, IgA 4 0 - 19 units    Tissue Transglutaminase IgA <2 0 - 3 U/mL    IgA 168 90 - 386 mg/dL   C-Peptide   Result Value Ref Range    C-Peptide <0.1 (L) 1.1 - 4.4 ng/mL   Glutamic Acid Decarboxylase   Result Value Ref Range    SALVADOR-65 11.7 (H) 0.0 - 5.0 U/mL   Hemoglobin A1c   Result Value Ref Range    Hemoglobin A1C 8.0 %   Lipid Panel   Result Value Ref Range    Total Cholesterol 150 130 - 200 mg/dL    Triglycerides 51 0 - 149 mg/dL    HDL Cholesterol 51 >=40 mg/dL    LDL Cholesterol  96 0 - 99 mg/dL    LDL/HDL Ratio 1.74    Microalbumin / Creatinine Urine Ratio - Urine, Clean Catch   Result Value Ref Range    Creatinine, Urine 15.7 Not Estab. mg/dL    Microalbumin, Urine <3.0 Not Estab. ug/mL    Microalbumin/Creatinine Ratio <19.1 0.0 - 30.0 mg/g creat   TSH   Result Value Ref Range    TSH 0.947 0.470 - 4.680 mIU/mL   Vitamin B12   Result Value Ref Range    Vitamin B-12 769 239 - 931 pg/mL   Vitamin D 25 Hydroxy   Result Value Ref Range    25 Hydroxy,  Vitamin D 33.4 30.0 - 100.0 ng/ml         Assessment/Plan       ICD-10-CM ICD-9-CM   1. Type 1 diabetes mellitus with hypoglycemia unawareness (CMS/[facility]) E10.649 250.81   2. Vitamin D deficiency E55.9 268.9   3. B12 deficiency E53.8 266.2         I reviewed and summarized records from Abhi Alejo DO from 2018 and I reviewed / ordered labs.   From review of records :    Pt has type 1 diabetes with hypo and hyperglycemia       Glycemic Management:   Lab Results   Component Value Date    HGBA1C 8.0 12/11/2018     Lab Results   Component Value Date    GLUCOSE 200 (H) 01/23/2018    BUN 12 01/23/2018    CREATININE 0.71 01/23/2018    EGFRIFNONA 133 01/23/2018    BCR 16.9 01/23/2018    K 4.0 01/23/2018    CO2 25.0 01/23/2018    CALCIUM 9.5 01/23/2018    ALBUMIN 4.90 01/23/2018    AST 38 01/23/2018    ALT 36 01/23/2018    ANIONGAP 17.0 (H) 01/23/2018     Lab Results   Component Value Date    WBC 5.70 12/11/2018    HGB 16.1 12/11/2018    HCT 44.8 12/11/2018    MCV 84.1 12/11/2018     12/11/2018     Basaglar 30 units qhs - change to 27    Humalog 15 -20 ( 1unit per 8 grams )      Approve for [place] [facility] 670 G      #1  Patient has diabetes mellitus, insulin-dependent.    #2 He performs blood glucose testing at least times daily and blood glucose log was brought to office with variability from     #3  He is requiring  Basal insulin  and Prandial Insulin for a total of at least  4 injections per day and has been doing this for at least 6 months.     #4 Patient tests blood sugars at least 4 times daily and makes frequent self-adjustments and patient is injecting insulin at least 4 times daily. He has been doing this for more than 6 months . He tests frequently due to hypoglycemia and hyperglycemia.     #5 I have personally seen patient within the past 6 months    #6 We plan on seeing her every 2-3 months for continuous adjustment of her diabetes regimen     #7 patient has hypoglycemia with episodes of  unawareness.    #8 patient has day-to-day variation in her mealtime which confounds the degree of insulin dosing with multiple daily injections.    #9 patient has completed diabetes education program with us.    #10 He has demonstrated the ability to self monitor her glucose.        #11 Patient is motivated in improving  diabetes control         Lipid Management  Lab Results   Component Value Date    CHOL 150 12/11/2018     Lab Results   Component Value Date    TRIG 51 12/11/2018     Lab Results   Component Value Date    HDL 51 12/11/2018     No components found for: LDLCALC  Lab Results   Component Value Date    LDL 96 12/11/2018     No results found for: LDLDIRECT    At goal without statin     Blood Pressure Management:    Vitals:    12/07/18 1300   BP: 122/70   Pulse: 66   SpO2: 100%     Lab Results   Component Value Date    GLUCOSE 200 (H) 01/23/2018    CALCIUM 9.5 01/23/2018     01/23/2018    K 4.0 01/23/2018    CO2 25.0 01/23/2018    CL 96 (L) 01/23/2018    BUN 12 01/23/2018    CREATININE 0.71 01/23/2018    EGFRIFNONA 133 01/23/2018    BCR 16.9 01/23/2018    ANIONGAP 17.0 (H) 01/23/2018       Nl         Microvascular Complication Monitoring:      Eye Exam Evaluation, needs referral     -----------    Last Microalbumin-Proteinuria Assessment    Lab Results   Component Value Date    MALBCRERATIO <19.1 12/11/2018       No results found for: UTPCR    -----------      Neuropathy, not from diabetes    Has carpal tunnel right hand not amenable to surgery     Immunizations:          Preventive Care:      I advised Jesse of the risks of continuing to use tobacco, and I provided him with tobacco cessation educational materials in the After Visit Summary.     During this visit, I spent 3 minutes counseling the patient regarding tobacco cessation.    Weight Related:   Wt Readings from Last 3 Encounters:   12/07/18 72.8 kg (160 lb 9.6 oz)     Body mass index is 21.78 kg/m².        Diet interventions: moderate (500  kCal/d) deficit diet.      Bone Health    Lab Results   Component Value Date    CALCIUM 9.5 01/23/2018    [id number] 33.4 12/11/2018       Thyroid Health    Lab Results   Component Value Date    TSH 0.947 12/11/2018              Other Diabetes Related Aspects       Lab Results   Component Value Date    [id number] 769 12/11/2018           Last celiac panel     Lab Results   Component Value Date    GDPABIGA 4 12/11/2018    TTRANSGLIGA <2 12/11/2018     12/11/2018         Orders Placed This Encounter   Procedures   • CBC Auto Differential   • Celiac Panel Reflex To Titer   • C-Peptide   • Glutamic Acid Decarboxylase   • Hemoglobin A1c   • Lipid Panel   • Microalbumin / Creatinine Urine Ratio - Urine, Clean Catch   • TSH   • Vitamin B12   • Vitamin D 25 Hydroxy         A copy of my note was sent to Abhi Alejo, DO    Please see my above opinion and suggestions.

## 2018-12-11 ENCOUNTER — APPOINTMENT (OUTPATIENT)
Dept: LAB | Facility: HOSPITAL | Age: 28
End: 2018-12-11

## 2018-12-11 LAB
25(OH)D3 SERPL-MCNC: 33.4 NG/ML (ref 30–100)
ARTICHOKE IGE QN: 96 MG/DL (ref 0–99)
BASOPHILS # BLD AUTO: 0.04 10*3/MM3 (ref 0–0.2)
BASOPHILS NFR BLD AUTO: 0.7 % (ref 0–2)
CHOLEST SERPL-MCNC: 150 MG/DL (ref 130–200)
DEPRECATED RDW RBC AUTO: 35.9 FL (ref 40–54)
EOSINOPHIL # BLD AUTO: 0.07 10*3/MM3 (ref 0–0.7)
EOSINOPHIL NFR BLD AUTO: 1.2 % (ref 0–4)
ERYTHROCYTE [DISTWIDTH] IN BLOOD BY AUTOMATED COUNT: 11.9 % (ref 12–15)
HBA1C MFR BLD: 8 %
HCT VFR BLD AUTO: 44.8 % (ref 40–52)
HDLC SERPL-MCNC: 51 MG/DL
HGB BLD-MCNC: 16.1 G/DL (ref 14–18)
IMM GRANULOCYTES # BLD: 0.01 10*3/MM3 (ref 0–0.03)
IMM GRANULOCYTES NFR BLD: 0.2 % (ref 0–5)
LDLC/HDLC SERPL: 1.74 {RATIO}
LYMPHOCYTES # BLD AUTO: 2.8 10*3/MM3 (ref 0.72–4.86)
LYMPHOCYTES NFR BLD AUTO: 49.1 % (ref 15–45)
MCH RBC QN AUTO: 30.2 PG (ref 28–32)
MCHC RBC AUTO-ENTMCNC: 35.9 G/DL (ref 33–36)
MCV RBC AUTO: 84.1 FL (ref 82–95)
MONOCYTES # BLD AUTO: 0.51 10*3/MM3 (ref 0.19–1.3)
MONOCYTES NFR BLD AUTO: 8.9 % (ref 4–12)
NEUTROPHILS # BLD AUTO: 2.27 10*3/MM3 (ref 1.87–8.4)
NEUTROPHILS NFR BLD AUTO: 39.9 % (ref 39–78)
NRBC BLD MANUAL-RTO: 0 /100 WBC (ref 0–0)
PLATELET # BLD AUTO: 248 10*3/MM3 (ref 130–400)
PMV BLD AUTO: 9.7 FL (ref 6–12)
RBC # BLD AUTO: 5.33 10*6/MM3 (ref 4.8–5.9)
TRIGL SERPL-MCNC: 51 MG/DL (ref 0–149)
TSH SERPL DL<=0.05 MIU/L-ACNC: 0.95 MIU/ML (ref 0.47–4.68)
VIT B12 BLD-MCNC: 769 PG/ML (ref 239–931)
WBC NRBC COR # BLD: 5.7 10*3/MM3 (ref 4.8–10.8)

## 2018-12-11 PROCEDURE — 84443 ASSAY THYROID STIM HORMONE: CPT | Performed by: INTERNAL MEDICINE

## 2018-12-11 PROCEDURE — 82607 VITAMIN B-12: CPT | Performed by: INTERNAL MEDICINE

## 2018-12-11 PROCEDURE — 83516 IMMUNOASSAY NONANTIBODY: CPT | Performed by: INTERNAL MEDICINE

## 2018-12-11 PROCEDURE — 82784 ASSAY IGA/IGD/IGG/IGM EACH: CPT | Performed by: INTERNAL MEDICINE

## 2018-12-11 PROCEDURE — 82043 UR ALBUMIN QUANTITATIVE: CPT | Performed by: INTERNAL MEDICINE

## 2018-12-11 PROCEDURE — 80061 LIPID PANEL: CPT | Performed by: INTERNAL MEDICINE

## 2018-12-11 PROCEDURE — 82306 VITAMIN D 25 HYDROXY: CPT | Performed by: INTERNAL MEDICINE

## 2018-12-11 PROCEDURE — 86341 ISLET CELL ANTIBODY: CPT | Performed by: INTERNAL MEDICINE

## 2018-12-11 PROCEDURE — 36415 COLL VENOUS BLD VENIPUNCTURE: CPT | Performed by: INTERNAL MEDICINE

## 2018-12-11 PROCEDURE — 84681 ASSAY OF C-PEPTIDE: CPT | Performed by: INTERNAL MEDICINE

## 2018-12-11 PROCEDURE — 83036 HEMOGLOBIN GLYCOSYLATED A1C: CPT | Performed by: INTERNAL MEDICINE

## 2018-12-11 PROCEDURE — 85025 COMPLETE CBC W/AUTO DIFF WBC: CPT | Performed by: INTERNAL MEDICINE

## 2018-12-11 PROCEDURE — 82570 ASSAY OF URINE CREATININE: CPT | Performed by: INTERNAL MEDICINE

## 2018-12-12 LAB
C PEPTIDE SERPL-MCNC: <0.1 NG/ML (ref 1.1–4.4)
CREAT 24H UR-MCNC: 15.7 MG/DL
GLIADIN PEPTIDE IGA SER-ACNC: 4 UNITS (ref 0–19)
IGA SERPL-MCNC: 168 MG/DL (ref 90–386)
MICROALBUMIN UR-MCNC: <3 UG/ML
MICROALBUMIN/CREAT UR: <19.1 MG/G CREAT (ref 0–30)
TTG IGA SER-ACNC: <2 U/ML (ref 0–3)

## 2018-12-13 LAB — GAD65 AB SER-ACNC: 11.7 U/ML (ref 0–5)

## 2019-01-04 ENCOUNTER — TELEPHONE (OUTPATIENT)
Dept: FAMILY MEDICINE CLINIC | Facility: CLINIC | Age: 29
End: 2019-01-04

## 2019-01-04 NOTE — TELEPHONE ENCOUNTER
Mom Ana has called and said she needs to talk to someone. They are having trouble with the insurance and getting the pump and Anibal? Please call her at [phone]

## 2019-01-04 NOTE — TELEPHONE ENCOUNTER
Pt called back and said they are needing a referral for pump and insulin. G6 is not covered at this time, says pt has been recently hospitalized again with DKA.

## 2019-01-08 ENCOUNTER — TELEPHONE (OUTPATIENT)
Dept: ENDOCRINOLOGY | Facility: CLINIC | Age: 29
End: 2019-01-08

## 2019-01-17 ENCOUNTER — TELEPHONE (OUTPATIENT)
Dept: ENDOCRINOLOGY | Facility: CLINIC | Age: 29
End: 2019-01-17

## 2019-01-24 ENCOUNTER — TELEPHONE (OUTPATIENT)
Dept: FAMILY MEDICINE CLINIC | Facility: CLINIC | Age: 29
End: 2019-01-24

## 2019-03-21 ENCOUNTER — OFFICE VISIT (OUTPATIENT)
Dept: ENDOCRINOLOGY | Facility: CLINIC | Age: 29
End: 2019-03-21

## 2019-03-21 VITALS
HEIGHT: 72 IN | WEIGHT: 164 LBS | SYSTOLIC BLOOD PRESSURE: 136 MMHG | HEART RATE: 66 BPM | DIASTOLIC BLOOD PRESSURE: 80 MMHG | BODY MASS INDEX: 22.21 KG/M2

## 2019-03-21 DIAGNOSIS — E10.649 TYPE 1 DIABETES MELLITUS WITH HYPOGLYCEMIA UNAWARENESS (HCC): Primary | ICD-10-CM

## 2019-03-21 DIAGNOSIS — F17.200 SMOKING: ICD-10-CM

## 2019-03-21 PROBLEM — IMO0001 SMOKING: Status: ACTIVE | Noted: 2019-03-21

## 2019-03-21 PROCEDURE — 99214 OFFICE O/P EST MOD 30 MIN: CPT | Performed by: NURSE PRACTITIONER

## 2019-03-21 NOTE — PROGRESS NOTES
"  Subjective    Jesse Woodson is a 28 y.o. male. he is here today for follow-up.    History of Present Illness               Primary Care Provider     Abhi Alejo,      Duration since age 8 years     Timing - Diabetes is Constant     Quality -  needs improvement     Severity -  severe     Complications - peripheral vascular disease     Current symptoms/problems DKA and Hypoglycemia Unawareness     Alleviating Factors: Compliance       Current diet  in general, a \"healthy\" diet       Current exercise walking     Current monitoring regimen: home blood tests - checking 4 x daily     Lab Results   Component Value Date    HGBA1C 8.0 12/11/2018       Home blood sugar records:     On tandem insulin pump    Most at goal     Having lows after lunch          Hypoglycemia unawareness, nocturnal, exertional , postprandial, if skipped meals , has had syncope  and has had ambulance and/or ER visits       The following portions of the patient's history were reviewed and updated as appropriate:   Past Medical History:   Diagnosis Date   • Bipolar depression (CMS/[facility]) 12/7/2018   • Type 1 diabetes mellitus with hypoglycemia unawareness (CMS/[facility]) 12/7/2018     No past surgical history on file.  Family History   Problem Relation Age of Onset   • Diabetes type II Maternal Grandmother        Current Outpatient Medications   Medication Sig Dispense Refill   • amphetamine-dextroamphetamine (ADDERALL) 20 MG tablet Take 20 mg by mouth Daily.     • clonazePAM (KlonoPIN) 0.5 MG tablet Take 0.5 mg by mouth 2 (Two) Times a Day As Needed for Seizures.     • gabapentin (NEURONTIN) 100 MG capsule Take 200 mg by mouth 2 (Two) Times a Day.     • Insulin Glargine (BASAGLAR KWIKPEN) 100 UNIT/ML injection pen Inject 30 Units under the skin into the appropriate area as directed Every Night.     • Insulin Lispro (HUMALOG KWIKPEN) 100 UNIT/ML solution pen-injector Inject  under the skin into the appropriate area as directed. Up to 20 units " per meal 3 times daily     • insulin lispro (humaLOG) 100 UNIT/ML injection Use to inject up to 100 units daily through pump 30 mL 11   • lamoTRIgine (LaMICtal) 100 MG tablet Take 200 mg by mouth Daily.       No current facility-administered medications for this visit.      No Known Allergies  Social History     Socioeconomic History   • Marital status: Single     Spouse name: Not on file   • Number of children: Not on file   • Years of education: Not on file   • Highest education level: Not on file   Tobacco Use   • Smoking status: Current Some Day Smoker   • Smokeless tobacco: Never Used   Substance and Sexual Activity   • Alcohol use: No     Frequency: Never   • Drug use: No   • Sexual activity: Defer       Review of Systems  Review of Systems   Constitutional: Negative for activity change, appetite change, diaphoresis and fatigue.   HENT: Negative for facial swelling, sneezing, sore throat, tinnitus, trouble swallowing and voice change.    Eyes: Negative for photophobia, pain, discharge, redness, itching and visual disturbance.   Respiratory: Negative for apnea, cough, choking, chest tightness and shortness of breath.    Cardiovascular: Negative for chest pain, palpitations and leg swelling.   Gastrointestinal: Negative for abdominal distention, abdominal pain, constipation, diarrhea, nausea and vomiting.   Endocrine: Negative for cold intolerance, heat intolerance, polydipsia, polyphagia and polyuria.   Genitourinary: Negative for difficulty urinating, dysuria, frequency, hematuria and urgency.   Musculoskeletal: Negative for arthralgias, back pain, gait problem, joint swelling, myalgias, neck pain and neck stiffness.   Skin: Negative for color change, pallor, rash and wound.   Neurological: Negative for dizziness, tremors, weakness, light-headedness, numbness and headaches.   Hematological: Negative for adenopathy. Does not bruise/bleed easily.   Psychiatric/Behavioral: Negative for behavioral problems,  "confusion and sleep disturbance.        Objective    /80 (BP Location: Left arm, Patient Position: Sitting, Cuff Size: Adult)   Pulse 66   Ht 182.9 cm (72\")   Wt 74.4 kg (164 lb)   BMI 22.24 kg/m²   Physical Exam   Constitutional: He is oriented to person, place, and time. He appears well-developed and well-nourished. No distress.   HENT:   Head: Normocephalic and atraumatic.   Right Ear: External ear normal.   Left Ear: External ear normal.   Nose: Nose normal.   Eyes: Conjunctivae and EOM are normal. Pupils are equal, round, and reactive to light.   Neck: Normal range of motion. Neck supple. No tracheal deviation present. No thyromegaly present.   Cardiovascular: Normal rate, regular rhythm and normal heart sounds.   No murmur heard.  Pulmonary/Chest: Effort normal and breath sounds normal. No respiratory distress. He has no wheezes.   Abdominal: Soft. Bowel sounds are normal. There is no tenderness. There is no rebound and no guarding.   Musculoskeletal: Normal range of motion. He exhibits no edema, tenderness or deformity.   Neurological: He is alert and oriented to person, place, and time. No cranial nerve deficit.   Skin: Skin is warm and dry. No rash noted.   Psychiatric: He has a normal mood and affect. His behavior is normal. Judgment and thought content normal.       Lab Review  Glucose (mg/dL)   Date Value   01/23/2018 200 (H)     Sodium (mmol/L)   Date Value   01/23/2018 138     Potassium (mmol/L)   Date Value   01/23/2018 4.0     Chloride (mmol/L)   Date Value   01/23/2018 96 (L)     CO2 (mmol/L)   Date Value   01/23/2018 25.0     BUN (mg/dL)   Date Value   01/23/2018 12     Creatinine (mg/dL)   Date Value   01/23/2018 0.71     Hemoglobin A1C (%)   Date Value   12/11/2018 8.0     Triglycerides (mg/dL)   Date Value   12/11/2018 51     LDL Cholesterol  (mg/dL)   Date Value   12/11/2018 96       Assessment/Plan      1. Type 1 diabetes mellitus with hypoglycemia unawareness (CMS/H[facility])  "   .    Medications prescribed:  Outpatient Encounter Medications as of 3/21/2019   Medication Sig Dispense Refill   • amphetamine-dextroamphetamine (ADDERALL) 20 MG tablet Take 20 mg by mouth Daily.     • clonazePAM (KlonoPIN) 0.5 MG tablet Take 0.5 mg by mouth 2 (Two) Times a Day As Needed for Seizures.     • gabapentin (NEURONTIN) 100 MG capsule Take 200 mg by mouth 2 (Two) Times a Day.     • Insulin Glargine (BASAGLAR KWIKPEN) 100 UNIT/ML injection pen Inject 30 Units under the skin into the appropriate area as directed Every Night.     • Insulin Lispro (HUMALOG KWIKPEN) 100 UNIT/ML solution pen-injector Inject  under the skin into the appropriate area as directed. Up to 20 units per meal 3 times daily     • insulin lispro (humaLOG) 100 UNIT/ML injection Use to inject up to 100 units daily through pump 30 mL 11   • lamoTRIgine (LaMICtal) 100 MG tablet Take 200 mg by mouth Daily.       No facility-administered encounter medications on file as of 3/21/2019.        Orders placed during this encounter include:  No orders of the defined types were placed in this encounter.    Pt has type 1 diabetes with hypo and hyperglycemia         Glycemic Management:       Lab Results   Component Value Date    HGBA1C 8.0 12/11/2018          Basaglar 30 units qhs - change to 27--stopped      Humalog 15 -20 ( 1unit per 8 grams )-stopped          Now on tandem insulin pump     MN - 0.980      Carb  Ratio     MN - 8  Added noon - 10  6 pm - 8     Correction     30       Target 120              Lipid Management            Lab Results   Component Value Date     LDL 96 12/11/2018           At goal without statin      Blood Pressure Management:          Normal without bp meds           Microvascular Complication Monitoring:       Eye Exam Evaluation, needs referral      -----------     Last Microalbumin-Proteinuria Assessment           Lab Results   Component Value Date     MALBCRERATIO <19.1 12/11/2018         No results found for:  UTPCR     -----------        Neuropathy, not from diabetes     Has carpal tunnel right hand not amenable to surgery      Immunizations:             Preventive Care:        I advised Jesse of the risks of continuing to use tobacco, and I provided him with tobacco cessation educational materials in the After Visit Summary.     During this visit, I spent less than 3  minutes counseling the patient regarding tobacco cessation.       Weight Related:   Patient's Body mass index is 22.24 kg/m². BMI is within normal range                  Bone Health           Lab Results   Component Value Date     CALCIUM 9.5 01/23/2018     [id number] 33.4 12/11/2018         Thyroid Health           Lab Results   Component Value Date     TSH 0.947 12/11/2018                  Other Diabetes Related Aspects               Lab Results   Component Value Date     [id number] 769 12/11/2018               Last celiac panel      Lab Results   Component Value Date     GDPABIGA 4 12/11/2018     TTRANSGLIGA <2 12/11/2018      12/11/2018            4. Follow-up: Return in about 3 months (around 6/21/2019) for Recheck.

## 2019-08-08 ENCOUNTER — OFFICE VISIT (OUTPATIENT)
Dept: ENDOCRINOLOGY | Facility: CLINIC | Age: 29
End: 2019-08-08

## 2019-08-08 ENCOUNTER — TELEPHONE (OUTPATIENT)
Dept: ENDOCRINOLOGY | Facility: CLINIC | Age: 29
End: 2019-08-08

## 2019-08-08 VITALS
HEIGHT: 72 IN | BODY MASS INDEX: 21.67 KG/M2 | DIASTOLIC BLOOD PRESSURE: 70 MMHG | HEART RATE: 77 BPM | WEIGHT: 160 LBS | SYSTOLIC BLOOD PRESSURE: 124 MMHG

## 2019-08-08 DIAGNOSIS — E10.649 TYPE 1 DIABETES MELLITUS WITH HYPOGLYCEMIA UNAWARENESS (HCC): Primary | ICD-10-CM

## 2019-08-08 DIAGNOSIS — F17.200 SMOKING: ICD-10-CM

## 2019-08-08 LAB — HBA1C MFR BLD: 7.3 %

## 2019-08-08 PROCEDURE — 99213 OFFICE O/P EST LOW 20 MIN: CPT | Performed by: NURSE PRACTITIONER

## 2019-08-08 RX ORDER — LURASIDONE HYDROCHLORIDE 20 MG/1
20 TABLET, FILM COATED ORAL DAILY
COMMUNITY
Start: 2019-08-07

## 2019-08-08 NOTE — PROGRESS NOTES
"  Subjective    Jesse Woodson is a 29 y.o. male. he is here today for follow-up.    History of Present Illness       Primary Care Provider     Abhi Alejo,      Duration since age 8 years     Timing - Diabetes is Constant     Quality -  needs improvement     Severity -  severe     Complications - peripheral vascular disease     Current symptoms/problems DKA and Hypoglycemia Unawareness     Alleviating Factors: Compliance       Current diet  in general, a \"healthy\" diet       Current exercise walking     Current monitoring regimen: home blood tests - checking 4 x daily            Lab Results   Component Value Date     HGBA1C 8.0 12/11/2018             Last HgbA1c 7.3% in April 2019       Home blood sugar records:      On tandem insulin pump     Most at goal      Having lows when at work            Hypoglycemia unawareness, nocturnal, exertional , postprandial, if skipped meals , has had syncope  and has had ambulance and/or ER visits             The following portions of the patient's history were reviewed and updated as appropriate:   Past Medical History:   Diagnosis Date   • Bipolar depression (CMS/[facility]) 12/7/2018   • Type 1 diabetes mellitus with hypoglycemia unawareness (CMS/[facility]) 12/7/2018     History reviewed. No pertinent surgical history.  Family History   Problem Relation Age of Onset   • Diabetes type II Maternal Grandmother        Current Outpatient Medications   Medication Sig Dispense Refill   • amphetamine-dextroamphetamine (ADDERALL) 20 MG tablet Take 20 mg by mouth Daily.     • clonazePAM (KlonoPIN) 0.5 MG tablet Take 0.5 mg by mouth 2 (Two) Times a Day As Needed for Seizures.     • gabapentin (NEURONTIN) 100 MG capsule Take 200 mg by mouth 2 (Two) Times a Day.     • Insulin Glargine (BASAGLAR KWIKPEN) 100 UNIT/ML injection pen Inject 30 Units under the skin into the appropriate area as directed Every Night.     • Insulin Lispro (HUMALOG KWIKPEN) 100 UNIT/ML solution pen-injector Inject "  under the skin into the appropriate area as directed. Up to 20 units per meal 3 times daily     • insulin lispro (humaLOG) 100 UNIT/ML injection Use to inject up to 100 units daily through pump 30 mL 11   • lamoTRIgine (LaMICtal) 100 MG tablet Take 200 mg by mouth Daily.     • LATUDA 20 MG tablet tablet        No current facility-administered medications for this visit.      No Known Allergies  Social History     Socioeconomic History   • Marital status: Single     Spouse name: Not on file   • Number of children: Not on file   • Years of education: Not on file   • Highest education level: Not on file   Tobacco Use   • Smoking status: Current Some Day Smoker   • Smokeless tobacco: Never Used   Substance and Sexual Activity   • Alcohol use: No     Frequency: Never   • Drug use: No   • Sexual activity: Defer       Review of Systems  Review of Systems   Constitutional: Negative for activity change, appetite change, diaphoresis and fatigue.   HENT: Negative for facial swelling, sneezing, sore throat, tinnitus, trouble swallowing and voice change.    Eyes: Negative for photophobia, pain, discharge, redness, itching and visual disturbance.   Respiratory: Negative for apnea, cough, choking, chest tightness and shortness of breath.    Cardiovascular: Negative for chest pain, palpitations and leg swelling.   Gastrointestinal: Negative for abdominal distention, abdominal pain, constipation, diarrhea, nausea and vomiting.   Endocrine: Negative for cold intolerance, heat intolerance, polydipsia, polyphagia and polyuria.   Genitourinary: Negative for difficulty urinating, dysuria, frequency, hematuria and urgency.   Musculoskeletal: Negative for arthralgias, back pain, gait problem, joint swelling, myalgias, neck pain and neck stiffness.   Skin: Negative for color change, pallor, rash and wound.   Neurological: Negative for dizziness, tremors, weakness, light-headedness, numbness and headaches.   Hematological: Negative for  "adenopathy. Does not bruise/bleed easily.   Psychiatric/Behavioral: Negative for behavioral problems, confusion and sleep disturbance.        Objective    /70 (BP Location: Right arm, Patient Position: Sitting, Cuff Size: Adult)   Pulse 77   Ht 182.9 cm (72\")   Wt 72.6 kg (160 lb)   BMI 21.70 kg/m²   Physical Exam   Constitutional: He is oriented to person, place, and time. He appears well-developed and well-nourished. No distress.   HENT:   Head: Normocephalic and atraumatic.   Right Ear: External ear normal.   Left Ear: External ear normal.   Nose: Nose normal.   Eyes: Conjunctivae and EOM are normal. Pupils are equal, round, and reactive to light.   Neck: Normal range of motion. Neck supple. No tracheal deviation present. No thyromegaly present.   Cardiovascular: Normal rate, regular rhythm and normal heart sounds.   No murmur heard.  Pulmonary/Chest: Effort normal and breath sounds normal. No respiratory distress. He has no wheezes.   Abdominal: Soft. Bowel sounds are normal. There is no tenderness. There is no rebound and no guarding.   Musculoskeletal: Normal range of motion. He exhibits no edema, tenderness or deformity.   Neurological: He is alert and oriented to person, place, and time. No cranial nerve deficit.   Skin: Skin is warm and dry. No rash noted.   Psychiatric: He has a normal mood and affect. His behavior is normal. Judgment and thought content normal.       Lab Review  Glucose (mg/dL)   Date Value   01/23/2018 200 (H)     Sodium (mmol/L)   Date Value   01/23/2018 138     Potassium (mmol/L)   Date Value   01/23/2018 4.0     Chloride (mmol/L)   Date Value   01/23/2018 96 (L)     CO2 (mmol/L)   Date Value   01/23/2018 25.0     BUN (mg/dL)   Date Value   01/23/2018 12     Creatinine (mg/dL)   Date Value   01/23/2018 0.71     Hemoglobin A1C   Date Value   04/02/2019 7.3   12/11/2018 8.0 %     Triglycerides (mg/dL)   Date Value   12/11/2018 51     LDL Cholesterol  (mg/dL)   Date Value "   12/11/2018 96       Assessment/Plan      1. Type 1 diabetes mellitus with hypoglycemia unawareness (CMS/[facility])    2. Smoking    .    Medications prescribed:  Outpatient Encounter Medications as of 8/8/2019   Medication Sig Dispense Refill   • amphetamine-dextroamphetamine (ADDERALL) 20 MG tablet Take 20 mg by mouth Daily.     • clonazePAM (KlonoPIN) 0.5 MG tablet Take 0.5 mg by mouth 2 (Two) Times a Day As Needed for Seizures.     • gabapentin (NEURONTIN) 100 MG capsule Take 200 mg by mouth 2 (Two) Times a Day.     • Insulin Glargine (BASAGLAR KWIKPEN) 100 UNIT/ML injection pen Inject 30 Units under the skin into the appropriate area as directed Every Night.     • Insulin Lispro (HUMALOG KWIKPEN) 100 UNIT/ML solution pen-injector Inject  under the skin into the appropriate area as directed. Up to 20 units per meal 3 times daily     • insulin lispro (humaLOG) 100 UNIT/ML injection Use to inject up to 100 units daily through pump 30 mL 11   • lamoTRIgine (LaMICtal) 100 MG tablet Take 200 mg by mouth Daily.     • LATUDA 20 MG tablet tablet        No facility-administered encounter medications on file as of 8/8/2019.        Orders placed during this encounter include:  Orders Placed This Encounter   Procedures   • Hemoglobin A1c     This order was created through External Result Entry   • Comprehensive Metabolic Panel     Standing Status:   Future     Standing Expiration Date:   8/8/2020   • Hemoglobin A1c     Standing Status:   Future     Standing Expiration Date:   8/8/2020   • Microalbumin / Creatinine Urine Ratio - Urine, Clean Catch     Standing Status:   Future     Standing Expiration Date:   8/8/2020   • Protein / Creatinine Ratio, Urine - Urine, Clean Catch     Standing Status:   Future     Standing Expiration Date:   8/8/2020   • TSH     Standing Status:   Future     Standing Expiration Date:   8/8/2020   • Vitamin B12     Standing Status:   Future     Standing Expiration Date:   8/8/2020   • Vitamin D 25  Hydroxy     Standing Status:   Future     Standing Expiration Date:   8/8/2020   • CBC & Differential     Standing Status:   Future     Standing Expiration Date:   8/8/2020     Order Specific Question:   Manual Differential     Answer:   No     Pt has type 1 diabetes with hypo and hyperglycemia         Glycemic Management:      Lab Results   Component Value Date    HGBA1C 7.3 04/02/2019                Basaglar 30 units qhs - change to 27--stopped      Humalog 15 -20 ( 1unit per 8 grams )-stopped           Now on tandem insulin pump      MN - 0.980        Carb  Ratio      MN - 8  Added noon - 10  6 pm - 8      Correction      30           Target 120     He will try a temp basal of 80% during work hours to prevent the low           approve dexcom       1. Patient is diabetic, insulin dependent  2. He checks his sugars 4 times daily with variability from 50 up to 400  3, He takes basal insulin and prandial insulin 3 times daily for a total of 4 injections per day  4. Based on glucose readings we make adjustments to the insulin  5. We have not achieved ideal outcomes with more information with a continuous  glucose sensor we should be able to do so  6. We see him every 2 to 3 months for diabetes management  7. Patient has hypoglycemia with unawareness  8. Patient has completed diabetes education  9. Patient has day to day variation in mealtime which confounds the degree of insulin dosing with multiple injections unless we have the CGMS that allows us to better  insulin dosing     Target 120               Lipid Management                  Lab Results   Component Value Date     LDL 96 12/11/2018            At goal without statin      Blood Pressure Management:          Normal without bp meds           Microvascular Complication Monitoring:       Eye Exam Evaluation, needs referral      -----------     Last Microalbumin-Proteinuria Assessment               Lab Results   Component Value Date     MALBCRERATIO <19.1  12/11/2018         No results found for: UTPCR     -----------        Neuropathy, not from diabetes     Has carpal tunnel right hand not amenable to surgery      Immunizations:             Preventive Care:          Smoking     Cessation discussed          Weight Related:       Patient's Body mass index is 21.7 kg/m². BMI is above normal parameters. Recommendations include: educational material.                   Bone Health               Lab Results   Component Value Date     CALCIUM 9.5 01/23/2018     [id number] 33.4 12/11/2018         Thyroid Health               Lab Results   Component Value Date     TSH 0.947 12/11/2018                  Other Diabetes Related Aspects                   Lab Results   Component Value Date     [id number] 769 12/11/2018               Last celiac panel            Lab Results   Component Value Date     GDPABIGA 4 12/11/2018     TTRANSGLIGA <2 12/11/2018      12/11/2018          4. Follow-up: Return in about 3 months (around 11/8/2019) for Recheck.

## 2019-12-26 ENCOUNTER — TELEPHONE (OUTPATIENT)
Dept: ENDOCRINOLOGY | Facility: CLINIC | Age: 29
End: 2019-12-26

## 2020-01-09 ENCOUNTER — OFFICE VISIT (OUTPATIENT)
Dept: ENDOCRINOLOGY | Facility: CLINIC | Age: 30
End: 2020-01-09

## 2020-01-09 VITALS
SYSTOLIC BLOOD PRESSURE: 126 MMHG | HEIGHT: 72 IN | DIASTOLIC BLOOD PRESSURE: 82 MMHG | OXYGEN SATURATION: 98 % | BODY MASS INDEX: 21.29 KG/M2 | WEIGHT: 157.2 LBS | HEART RATE: 68 BPM

## 2020-01-09 DIAGNOSIS — F17.200 SMOKING: ICD-10-CM

## 2020-01-09 DIAGNOSIS — E55.9 VITAMIN D DEFICIENCY: ICD-10-CM

## 2020-01-09 DIAGNOSIS — E10.649 TYPE 1 DIABETES MELLITUS WITH HYPOGLYCEMIA UNAWARENESS (HCC): Primary | ICD-10-CM

## 2020-01-09 PROCEDURE — 99214 OFFICE O/P EST MOD 30 MIN: CPT | Performed by: NURSE PRACTITIONER

## 2020-01-09 NOTE — PROGRESS NOTES
"  Subjective    Jesse Woodson is a 29 y.o. male. he is here today for follow-up.    History of Present Illness       Primary Care Provider     Abhi Alejo,      Duration since age 8 years     Timing - Diabetes is Constant     Quality -  needs improvement     Severity -  severe     Complications - peripheral vascular disease     Current symptoms/problems DKA and Hypoglycemia Unawareness     Alleviating Factors: Compliance       Current diet  in general, a \"healthy\" diet       Current exercise walking     Current monitoring regimen: home blood tests - checking 4 x daily        Lab Results   Component Value Date    HGBA1C 7.3 04/02/2019             Home blood sugar records:      On tandem insulin pump     Most at goal     Was having            Hypoglycemia unawareness, nocturnal, exertional , postprandial, if skipped meals , has had syncope  and has had ambulance and/or ER visits            The following portions of the patient's history were reviewed and updated as appropriate:   Past Medical History:   Diagnosis Date   • Bipolar depression (CMS/[facility]) 12/7/2018   • Type 1 diabetes mellitus with hypoglycemia unawareness (CMS/[facility]) 12/7/2018     History reviewed. No pertinent surgical history.  Family History   Problem Relation Age of Onset   • Diabetes type II Maternal Grandmother        Current Outpatient Medications   Medication Sig Dispense Refill   • ADMELOG 100 UNIT/ML injection USE TO INJECT 100 UNITS EVERY DAY THROUGH INSULIN PUMP 30 mL 11   • amphetamine-dextroamphetamine (ADDERALL) 20 MG tablet Take 20 mg by mouth Daily.     • clonazePAM (KlonoPIN) 0.5 MG tablet Take 0.5 mg by mouth 2 (Two) Times a Day As Needed for Seizures.     • gabapentin (NEURONTIN) 100 MG capsule Take 200 mg by mouth 2 (Two) Times a Day.     • Insulin Glargine (BASAGLAR KWIKPEN) 100 UNIT/ML injection pen Inject 30 Units under the skin into the appropriate area as directed Every Night.     • lamoTRIgine (LaMICtal) 100 MG " tablet Take 200 mg by mouth Daily.     • LATUDA 20 MG tablet tablet      • glucagon (GLUCAGON EMERGENCY) 1 MG injection Inject 1 mg under the skin into the appropriate area as directed 1 (One) Time As Needed for Low Blood Sugar for up to 1 dose. 1 kit 12     No current facility-administered medications for this visit.      No Known Allergies  Social History     Socioeconomic History   • Marital status: Single     Spouse name: Not on file   • Number of children: Not on file   • Years of education: Not on file   • Highest education level: Not on file   Tobacco Use   • Smoking status: Current Some Day Smoker   • Smokeless tobacco: Never Used   Substance and Sexual Activity   • Alcohol use: No     Frequency: Never   • Drug use: No   • Sexual activity: Defer       Review of Systems  Review of Systems   Constitutional: Negative for activity change, appetite change, diaphoresis and fatigue.   HENT: Negative for facial swelling, sneezing, sore throat, tinnitus, trouble swallowing and voice change.    Eyes: Negative for photophobia, pain, discharge, redness, itching and visual disturbance.   Respiratory: Negative for apnea, cough, choking, chest tightness and shortness of breath.    Cardiovascular: Negative for chest pain, palpitations and leg swelling.   Gastrointestinal: Negative for abdominal distention, abdominal pain, constipation, diarrhea, nausea and vomiting.   Endocrine: Negative for cold intolerance, heat intolerance, polydipsia, polyphagia and polyuria.   Genitourinary: Negative for difficulty urinating, dysuria, frequency, hematuria and urgency.   Musculoskeletal: Negative for arthralgias, back pain, gait problem, joint swelling, myalgias, neck pain and neck stiffness.   Skin: Negative for color change, pallor, rash and wound.   Neurological: Negative for dizziness, tremors, weakness, light-headedness, numbness and headaches.   Hematological: Negative for adenopathy. Does not bruise/bleed easily.  "  Psychiatric/Behavioral: Negative for behavioral problems, confusion and sleep disturbance.        Objective    /82   Pulse 68   Ht 182.9 cm (72\")   Wt 71.3 kg (157 lb 3.2 oz)   SpO2 98%   BMI 21.32 kg/m²   Physical Exam   Constitutional: He is oriented to person, place, and time. He appears well-developed and well-nourished. No distress.   HENT:   Head: Normocephalic and atraumatic.   Right Ear: External ear normal.   Left Ear: External ear normal.   Nose: Nose normal.   Eyes: Pupils are equal, round, and reactive to light. Conjunctivae and EOM are normal.   Neck: Normal range of motion. Neck supple. No tracheal deviation present. No thyromegaly present.   Cardiovascular: Normal rate, regular rhythm and normal heart sounds.   No murmur heard.  Pulmonary/Chest: Effort normal and breath sounds normal. No respiratory distress. He has no wheezes.   Abdominal: Soft. Bowel sounds are normal. There is no tenderness. There is no rebound and no guarding.   Musculoskeletal: Normal range of motion. He exhibits no edema, tenderness or deformity.   Neurological: He is alert and oriented to person, place, and time. No cranial nerve deficit.   Skin: Skin is warm and dry. No rash noted.   Psychiatric: He has a normal mood and affect. His behavior is normal. Judgment and thought content normal.       Lab Review  Glucose (mg/dL)   Date Value   01/23/2018 200 (H)     Sodium (mmol/L)   Date Value   01/23/2018 138     Potassium (mmol/L)   Date Value   01/23/2018 4.0     Chloride (mmol/L)   Date Value   01/23/2018 96 (L)     CO2 (mmol/L)   Date Value   01/23/2018 25.0     BUN (mg/dL)   Date Value   01/23/2018 12     Creatinine (mg/dL)   Date Value   01/23/2018 0.71     Hemoglobin A1C   Date Value   04/02/2019 7.3   12/11/2018 8.0 %     Triglycerides (mg/dL)   Date Value   12/11/2018 51     LDL Cholesterol  (mg/dL)   Date Value   12/11/2018 96       Assessment/Plan      1. Type 1 diabetes mellitus with hypoglycemia " unawareness (CMS/[facility])    2. Vitamin D deficiency    3. Smoking    .    Medications prescribed:  Outpatient Encounter Medications as of 1/9/2020   Medication Sig Dispense Refill   • ADMELOG 100 UNIT/ML injection USE TO INJECT 100 UNITS EVERY DAY THROUGH INSULIN PUMP 30 mL 11   • amphetamine-dextroamphetamine (ADDERALL) 20 MG tablet Take 20 mg by mouth Daily.     • clonazePAM (KlonoPIN) 0.5 MG tablet Take 0.5 mg by mouth 2 (Two) Times a Day As Needed for Seizures.     • gabapentin (NEURONTIN) 100 MG capsule Take 200 mg by mouth 2 (Two) Times a Day.     • Insulin Glargine (BASAGLAR KWIKPEN) 100 UNIT/ML injection pen Inject 30 Units under the skin into the appropriate area as directed Every Night.     • lamoTRIgine (LaMICtal) 100 MG tablet Take 200 mg by mouth Daily.     • LATUDA 20 MG tablet tablet      • [DISCONTINUED] ADMELOG 100 UNIT/ML injection USE TO INJECT 100 UNITS EVERY DAY THROUGH INSULIN PUMP 30 mL 2   • glucagon (GLUCAGON EMERGENCY) 1 MG injection Inject 1 mg under the skin into the appropriate area as directed 1 (One) Time As Needed for Low Blood Sugar for up to 1 dose. 1 kit 12     No facility-administered encounter medications on file as of 1/9/2020.        Orders placed during this encounter include:  Orders Placed This Encounter   Procedures   • Comprehensive Metabolic Panel   • Hemoglobin A1c   • Protein / Creatinine Ratio, Urine - Urine, Clean Catch   • Microalbumin / Creatinine Urine Ratio - Urine, Clean Catch   • TSH   • Vitamin D 25 Hydroxy   • CBC & Differential     Order Specific Question:   Manual Differential     Answer:   No     Pt has type 1 diabetes with hypo and hyperglycemia         Glycemic Management:            Lab Results   Component Value Date     HGBA1C 7.3 04/02/2019                  Basaglar 30 units qhs - change to 27--stopped      Humalog 15 -20 ( 1unit per 8 grams )-stopped           Now on tandem insulin pump      MN - 0.92        Carb  Ratio      MN - 8        Correction       30            Target 120      He will try a temp basal of 80% during work hours to prevent the low        Dexcom G5 -- upgrade to dexcom G6             Lipid Management                  Lab Results   Component Value Date     LDL 96 12/11/2018            At goal without statin      Blood Pressure Management:          Normal without bp meds           Microvascular Complication Monitoring:       Eye Exam Evaluation, needs referral      -----------     Last Microalbumin-Proteinuria Assessment               Lab Results   Component Value Date     MALBCRERATIO <19.1 12/11/2018         No results found for: UTPCR     -----------        Neuropathy, not from diabetes     Has carpal tunnel right hand not amenable to surgery      Immunizations:             Preventive Care:          Smoking     Jesse Woodson  reports that he has been smoking. He has never used smokeless tobacco.. I have educated him on the risk of diseases from using tobacco products such as cancer, COPD and heart diease.     I advised him to quit and he is not willing to quit.    I spent 3  minutes counseling the patient.               Weight Related:         Patient's Body mass index is 21.32 kg/m². BMI is within normal parameters. No follow-up required..    Decrease daily caloric intake by 500 calories per day                       Bone Health      Vitamin d def      Reassess         Thyroid Health      Lab Results   Component Value Date    TSH 0.947 12/11/2018                      Other Diabetes Related Aspects                   Lab Results   Component Value Date     [id number] 769 12/11/2018               Last celiac panel                Lab Results   Component Value Date     GDPABIGA 4 12/11/2018     TTRANSGLIGA <2 12/11/2018      12/11/2018            4. Follow-up: Return in about 6 months (around 7/9/2020) for Recheck.

## 2020-06-22 ENCOUNTER — TELEPHONE (OUTPATIENT)
Dept: ENDOCRINOLOGY | Facility: CLINIC | Age: 30
End: 2020-06-22

## 2020-06-22 NOTE — TELEPHONE ENCOUNTER
Faxed lab work.     Pt is wants lab orders to be faxed over to [phone]. The name of the facility is [facility]. Their phone number is [phone].

## 2020-06-22 NOTE — TELEPHONE ENCOUNTER
Pt is wants lab orders to be faxed over to [phone]. The name of the facility is [facility]. Their phone number is [phone].

## 2020-07-01 ENCOUNTER — TELEPHONE (OUTPATIENT)
Dept: ENDOCRINOLOGY | Facility: CLINIC | Age: 30
End: 2020-07-01

## 2020-07-01 NOTE — TELEPHONE ENCOUNTER
Lab orders did not come in so needs faxed asap wants to do today  to   Fax # [phone]    Believe we had wrong lab fax so she got the one they said goes straight to the lab.    Thank You

## 2020-07-09 ENCOUNTER — OFFICE VISIT (OUTPATIENT)
Dept: ENDOCRINOLOGY | Facility: CLINIC | Age: 30
End: 2020-07-09

## 2020-07-09 ENCOUNTER — TELEPHONE (OUTPATIENT)
Dept: ENDOCRINOLOGY | Facility: CLINIC | Age: 30
End: 2020-07-09

## 2020-07-09 VITALS
WEIGHT: 160.5 LBS | HEART RATE: 90 BPM | HEIGHT: 71 IN | BODY MASS INDEX: 22.47 KG/M2 | OXYGEN SATURATION: 99 % | SYSTOLIC BLOOD PRESSURE: 108 MMHG | DIASTOLIC BLOOD PRESSURE: 62 MMHG

## 2020-07-09 DIAGNOSIS — E55.9 VITAMIN D DEFICIENCY: Primary | ICD-10-CM

## 2020-07-09 DIAGNOSIS — E10.649 TYPE 1 DIABETES MELLITUS WITH HYPOGLYCEMIA UNAWARENESS (HCC): ICD-10-CM

## 2020-07-09 PROCEDURE — 99214 OFFICE O/P EST MOD 30 MIN: CPT | Performed by: NURSE PRACTITIONER

## 2020-07-09 NOTE — PROGRESS NOTES
"  Subjective    Jesse Woodson is a 30 y.o. male. he is here today for follow-up.    History of Present Illness      IN OFFICE VISIT      Primary Care Provider     Abhi Alejo,     REASON - DIABETES MELLITUS TYPE 1     Duration since age 8 years     Timing - Diabetes is Constant     Quality -  needs improvement     Severity -  severe     Complications - peripheral vascular disease     Current symptoms/problems DKA and Hypoglycemia Unawareness     Alleviating Factors: Compliance       Current diet  in general, a \"healthy\" diet       Current exercise walking     Current monitoring regimen: home blood tests - checking 4 x daily                 Lab Results   Component Value Date    HGBA1C 7.3 04/02/2019          Home blood sugar records:      On tandem insulin pump     Hypoglycemia occuring when missing meals    In office 68 -- gave juice up to 80         Hypoglycemia unawareness, nocturnal, exertional , postprandial, if skipped meals , has had syncope  and has had ambulance and/or ER visits            The following portions of the patient's history were reviewed and updated as appropriate:   Past Medical History:   Diagnosis Date   • Bipolar depression (CMS/[facility]) 12/7/2018   • Type 1 diabetes mellitus with hypoglycemia unawareness (CMS/[facility]) 12/7/2018     No past surgical history on file.  Family History   Problem Relation Age of Onset   • Diabetes type II Maternal Grandmother        Current Outpatient Medications   Medication Sig Dispense Refill   • ADMELOG 100 UNIT/ML injection USE TO INJECT 100 UNITS EVERY DAY THROUGH INSULIN PUMP 30 mL 11   • amphetamine-dextroamphetamine (ADDERALL) 20 MG tablet Take 20 mg by mouth Daily.     • clonazePAM (KlonoPIN) 0.5 MG tablet Take 0.5 mg by mouth 2 (Two) Times a Day As Needed for Seizures.     • glucagon (GLUCAGON EMERGENCY) 1 MG injection Inject 1 mg under the skin into the appropriate area as directed 1 (One) Time As Needed for Low Blood Sugar for up to 1 dose. 1 kit " 12   • lamoTRIgine (LaMICtal) 100 MG tablet Take 200 mg by mouth Daily.     • LATUDA 20 MG tablet tablet      • gabapentin (NEURONTIN) 100 MG capsule Take 200 mg by mouth 2 (Two) Times a Day.     • Insulin Glargine (BASAGLAR KWIKPEN) 100 UNIT/ML injection pen Inject 30 Units under the skin into the appropriate area as directed Every Night.       No current facility-administered medications for this visit.      No Known Allergies  Social History     Socioeconomic History   • Marital status: Single     Spouse name: Not on file   • Number of children: Not on file   • Years of education: Not on file   • Highest education level: Not on file   Tobacco Use   • Smoking status: Current Some Day Smoker   • Smokeless tobacco: Never Used   Substance and Sexual Activity   • Alcohol use: No     Frequency: Never   • Drug use: No   • Sexual activity: Defer       Review of Systems  Review of Systems   Constitutional: Negative for activity change, appetite change, diaphoresis and fatigue.   HENT: Negative for facial swelling, sneezing, sore throat, tinnitus, trouble swallowing and voice change.    Eyes: Negative for photophobia, pain, discharge, redness, itching and visual disturbance.   Respiratory: Negative for apnea, cough, choking, chest tightness and shortness of breath.    Cardiovascular: Negative for chest pain, palpitations and leg swelling.   Gastrointestinal: Negative for abdominal distention, abdominal pain, constipation, diarrhea, nausea and vomiting.   Endocrine: Negative for cold intolerance, heat intolerance, polydipsia, polyphagia and polyuria.   Genitourinary: Negative for difficulty urinating, dysuria, frequency, hematuria and urgency.   Musculoskeletal: Negative for arthralgias, back pain, gait problem, joint swelling, myalgias, neck pain and neck stiffness.   Skin: Negative for color change, pallor, rash and wound.   Neurological: Negative for dizziness, tremors, weakness, light-headedness, numbness and headaches.  "  Hematological: Negative for adenopathy. Does not bruise/bleed easily.   Psychiatric/Behavioral: Negative for behavioral problems, confusion and sleep disturbance.        Objective    /62   Pulse 90   Ht 180.3 cm (71\")   Wt 72.8 kg (160 lb 8 oz)   SpO2 99%   BMI 22.39 kg/m²   Physical Exam   Constitutional: He is oriented to person, place, and time. He appears well-developed and well-nourished. No distress.   HENT:   Head: Normocephalic and atraumatic.   Right Ear: External ear normal.   Left Ear: External ear normal.   Nose: Nose normal.   Eyes: Pupils are equal, round, and reactive to light. Conjunctivae and EOM are normal.   Neck: Normal range of motion. Neck supple. No tracheal deviation present. No thyromegaly present.   Cardiovascular: Normal rate, regular rhythm and normal heart sounds.   No murmur heard.  Pulmonary/Chest: Effort normal and breath sounds normal. No respiratory distress. He has no wheezes.   Abdominal: Soft. Bowel sounds are normal. There is no tenderness. There is no rebound and no guarding.   Musculoskeletal: Normal range of motion. He exhibits no edema, tenderness or deformity.   Neurological: He is alert and oriented to person, place, and time. No cranial nerve deficit.   Skin: Skin is warm and dry. No rash noted.   Psychiatric: He has a normal mood and affect. His behavior is normal. Judgment and thought content normal.       Lab Review  Glucose (mg/dL)   Date Value   01/23/2018 200 (H)     Sodium (mmol/L)   Date Value   01/23/2018 138     Potassium (mmol/L)   Date Value   01/23/2018 4.0     Chloride (mmol/L)   Date Value   01/23/2018 96 (L)     CO2 (mmol/L)   Date Value   01/23/2018 25.0     BUN (mg/dL)   Date Value   01/23/2018 12     Creatinine (mg/dL)   Date Value   01/23/2018 0.71     Hemoglobin A1C   Date Value   04/02/2019 7.3   12/11/2018 8.0 %     Triglycerides (mg/dL)   Date Value   12/11/2018 51     LDL Cholesterol  (mg/dL)   Date Value   12/11/2018 96 "       Assessment/Plan      1. Vitamin D deficiency    2. Type 1 diabetes mellitus with hypoglycemia unawareness (CMS/[facility])    .    Medications prescribed:  Outpatient Encounter Medications as of 7/9/2020   Medication Sig Dispense Refill   • ADMELOG 100 UNIT/ML injection USE TO INJECT 100 UNITS EVERY DAY THROUGH INSULIN PUMP 30 mL 11   • amphetamine-dextroamphetamine (ADDERALL) 20 MG tablet Take 20 mg by mouth Daily.     • clonazePAM (KlonoPIN) 0.5 MG tablet Take 0.5 mg by mouth 2 (Two) Times a Day As Needed for Seizures.     • glucagon (GLUCAGON EMERGENCY) 1 MG injection Inject 1 mg under the skin into the appropriate area as directed 1 (One) Time As Needed for Low Blood Sugar for up to 1 dose. 1 kit 12   • lamoTRIgine (LaMICtal) 100 MG tablet Take 200 mg by mouth Daily.     • LATUDA 20 MG tablet tablet      • [DISCONTINUED] ADMELOG 100 UNIT/ML injection USE TO INJECT 100 UNITS EVERY DAY THROUGH INSULIN PUMP 30 mL 11   • gabapentin (NEURONTIN) 100 MG capsule Take 200 mg by mouth 2 (Two) Times a Day.     • Insulin Glargine (BASAGLAR KWIKPEN) 100 UNIT/ML injection pen Inject 30 Units under the skin into the appropriate area as directed Every Night.       No facility-administered encounter medications on file as of 7/9/2020.        Orders placed during this encounter include:  Orders Placed This Encounter   Procedures   • Comprehensive Metabolic Panel     Standing Status:   Future     Standing Expiration Date:   7/9/2021   • Hemoglobin A1c     Standing Status:   Future     Standing Expiration Date:   7/9/2021   • Lipid Panel     Standing Status:   Future     Standing Expiration Date:   7/9/2021   • Vitamin D 25 Hydroxy     Standing Status:   Future     Standing Expiration Date:   7/9/2021   • Vitamin B12     Standing Status:   Future     Standing Expiration Date:   7/9/2021   • TSH     Standing Status:   Future     Standing Expiration Date:   7/9/2021   • Protein / Creatinine Ratio, Urine - Urine, Clean Catch      Standing Status:   Future     Standing Expiration Date:   7/9/2021   • Microalbumin / Creatinine Urine Ratio - Urine, Clean Catch     Standing Status:   Future     Standing Expiration Date:   7/9/2021   • CBC & Differential     Standing Status:   Future     Standing Expiration Date:   7/9/2021     Order Specific Question:   Manual Differential     Answer:   No     Pt has type 1 diabetes with hypo and hyperglycemia         Glycemic Management:                Lab Results   Component Value Date     HGBA1C 7.3 04/02/2019                  Basaglar 30 units qhs - change to 27--stopped      Humalog 15 -20 ( 1unit per 8 grams )-stopped           Now on tandem insulin pump      MN - 0.97--- decrease to 0.95         Carb  Ratio      MN - 8           Correction      30            Target 120      He will try a temp basal of 80% during work hours to prevent the low         D           Lipid Management                  Lab Results   Component Value Date     LDL 96 12/11/2018            At goal without statin      Blood Pressure Management:          Normal without bp meds           Microvascular Complication Monitoring:       Last eye exam -- march 2020 , no DR      -----------     Last Microalbumin-Proteinuria Assessment               Lab Results   Component Value Date     MALBCRERATIO <19.1 12/11/2018         No results found for: UTPCR     -----------        Neuropathy, not from diabetes     Has carpal tunnel right hand not amenable to surgery      I           Preventive Care:          Smoking      Jesse Woodson  reports that he has been smoking. He has never used smokeless tobacco.. I have educated him on the risk of diseases from using tobacco products such as cancer, COPD and heart diease.     I advised him to quit and he is not willing to quit.    I spent 3  minutes counseling the patient.                     Weight Related:            Patient's Body mass index is 22.39 kg/m². BMI is within normal parameters. No follow-up  required..       Decrease daily caloric intake by 500 calories per day                        Bone Health        Vitamin d def        Reassess         Thyroid Health              Lab Results   Component Value Date     TSH 0.947 12/11/2018                        Other Diabetes Related Aspects                   Lab Results   Component Value Date     [id number] 769 12/11/2018               Last celiac panel                Lab Results   Component Value Date     GDPABIGA 4 12/11/2018     TTRANSGLIGA <2 12/11/2018      12/11/2018                   4. Follow-up: Return in about 6 months (around 1/9/2021) for Recheck.

## 2020-08-19 ENCOUNTER — DOCUMENTATION (OUTPATIENT)
Dept: ENDOCRINOLOGY | Facility: CLINIC | Age: 30
End: 2020-08-19

## 2020-11-13 ENCOUNTER — DOCUMENTATION (OUTPATIENT)
Dept: ENDOCRINOLOGY | Facility: CLINIC | Age: 30
End: 2020-11-13

## 2021-01-12 ENCOUNTER — DOCUMENTATION (OUTPATIENT)
Dept: ENDOCRINOLOGY | Facility: CLINIC | Age: 31
End: 2021-01-12

## 2021-08-30 ENCOUNTER — LAB (OUTPATIENT)
Dept: LAB | Facility: HOSPITAL | Age: 31
End: 2021-08-30

## 2021-08-30 ENCOUNTER — DOCUMENTATION (OUTPATIENT)
Dept: ENDOCRINOLOGY | Facility: CLINIC | Age: 31
End: 2021-08-30

## 2021-08-30 ENCOUNTER — OFFICE VISIT (OUTPATIENT)
Dept: ENDOCRINOLOGY | Facility: CLINIC | Age: 31
End: 2021-08-30

## 2021-08-30 VITALS
HEART RATE: 76 BPM | WEIGHT: 155.7 LBS | HEIGHT: 72 IN | DIASTOLIC BLOOD PRESSURE: 64 MMHG | SYSTOLIC BLOOD PRESSURE: 110 MMHG | BODY MASS INDEX: 21.09 KG/M2 | OXYGEN SATURATION: 99 %

## 2021-08-30 DIAGNOSIS — E55.9 VITAMIN D DEFICIENCY: Primary | ICD-10-CM

## 2021-08-30 DIAGNOSIS — F17.200 SMOKING: ICD-10-CM

## 2021-08-30 DIAGNOSIS — E10.43 DIABETIC AUTONOMIC NEUROPATHY ASSOCIATED WITH TYPE 1 DIABETES MELLITUS (HCC): ICD-10-CM

## 2021-08-30 DIAGNOSIS — E10.65 TYPE 1 DIABETES MELLITUS WITH HYPERGLYCEMIA (HCC): ICD-10-CM

## 2021-08-30 PROCEDURE — 80050 GENERAL HEALTH PANEL: CPT | Performed by: NURSE PRACTITIONER

## 2021-08-30 PROCEDURE — 83036 HEMOGLOBIN GLYCOSYLATED A1C: CPT | Performed by: NURSE PRACTITIONER

## 2021-08-30 PROCEDURE — 82570 ASSAY OF URINE CREATININE: CPT | Performed by: NURSE PRACTITIONER

## 2021-08-30 PROCEDURE — 36415 COLL VENOUS BLD VENIPUNCTURE: CPT | Performed by: NURSE PRACTITIONER

## 2021-08-30 PROCEDURE — 82607 VITAMIN B-12: CPT | Performed by: NURSE PRACTITIONER

## 2021-08-30 PROCEDURE — 82306 VITAMIN D 25 HYDROXY: CPT | Performed by: NURSE PRACTITIONER

## 2021-08-30 PROCEDURE — 99214 OFFICE O/P EST MOD 30 MIN: CPT | Performed by: NURSE PRACTITIONER

## 2021-08-30 PROCEDURE — 84156 ASSAY OF PROTEIN URINE: CPT | Performed by: NURSE PRACTITIONER

## 2021-08-30 PROCEDURE — 82043 UR ALBUMIN QUANTITATIVE: CPT | Performed by: NURSE PRACTITIONER

## 2021-08-30 RX ORDER — GLUCAGON INJECTION, SOLUTION 1 MG/.2ML
1 INJECTION, SOLUTION SUBCUTANEOUS AS NEEDED
Qty: 0.4 ML | Refills: 11 | Status: SHIPPED | OUTPATIENT
Start: 2021-08-30 | End: 2021-08-31

## 2021-08-30 RX ORDER — GABAPENTIN 100 MG/1
100 CAPSULE ORAL 3 TIMES DAILY
Qty: 90 CAPSULE | Refills: 5 | Status: SHIPPED | OUTPATIENT
Start: 2021-08-30 | End: 2022-03-02 | Stop reason: SDUPTHER

## 2021-08-30 NOTE — PROGRESS NOTES
"Chief Complaint  Diabetes and Vitamin D Deficiency    Subjective          Jesse Woodson presents to [facility] ENDOCRINOLOGY  History of Present Illness       In office visit    Primary provider Abhi Alejo DO     31 year old male presents for follow up     Reason diabetes mellitus type 1     Timing constant    Quality not controlled    Severity high    Duration since age 8     Lab Results   Component Value Date    HGBA1C 7.3 04/02/2019         History of DKA , hypoglycemia unawareness     Macrovascular complications PVD    Microvascular complications --neuropathy      Current diabetes regimen -- insulin , through tandem insulin pump         Current monitoring regimen: home blood tests - checking 4 x daily     Dexcom G6       could not download         Home blood sugar records:      On tandem insulin pump             Hypoglycemia unawareness, nocturnal, exertional , postprandial, if skipped meals , has had syncope  and has had ambulance and/or ER visits          Review of Systems - General ROS: negative        Objective   Vital Signs:   /64   Pulse 76   Ht 182.9 cm (72\")   Wt 70.6 kg (155 lb 11.2 oz)   SpO2 99%   BMI 21.12 kg/m²     Physical Exam  Constitutional:       Appearance: Normal appearance.   Cardiovascular:      Rate and Rhythm: Regular rhythm.      Heart sounds: Normal heart sounds.   Pulmonary:      Breath sounds: Normal breath sounds.   Musculoskeletal:      Cervical back: Normal range of motion.   Neurological:      Mental Status: He is alert.        Result Review :   The following data was reviewed by: TAM Oviedo on 08/30/2021:                Assessment and Plan    Diagnoses and all orders for this visit:    1. Vitamin D deficiency (Primary)  -     CBC & Differential  -     Comprehensive Metabolic Panel  -     Hemoglobin A1c  -     Microalbumin / Creatinine Urine Ratio - Urine, Clean Catch  -     Protein / Creatinine Ratio, Urine - Urine, Clean " Catch  -     TSH  -     Vitamin B12  -     Vitamin D 25 Hydroxy    2. Type 1 diabetes mellitus with hyperglycemia (CMS/HCC)  -     CBC & Differential  -     Comprehensive Metabolic Panel  -     Hemoglobin A1c  -     Microalbumin / Creatinine Urine Ratio - Urine, Clean Catch  -     Protein / Creatinine Ratio, Urine - Urine, Clean Catch  -     TSH  -     Vitamin B12  -     Vitamin D 25 Hydroxy    3. Diabetic autonomic neuropathy associated with type 1 diabetes mellitus (CMS/HCC)  -     gabapentin (Neurontin) 100 MG capsule; Take 1 capsule by mouth 3 (Three) Times a Day.  Dispense: 90 capsule; Refill: 5    4. Smoking    Other orders  -     Glucagon (Gvoke PFS) 1 MG/0.2ML solution prefilled syringe; Inject 1 mg under the skin into the appropriate area as directed As Needed (hypoglycemia) for up to 1 day.  Dispense: 0.4 mL; Refill: 11             Pt has type 1 diabetes with hypo and hyperglycemia         Glycemic Management:                Lab Results   Component Value Date     HGBA1C 7.3 04/02/2019                  Dexcom but could not download      Now on tandem insulin pump      MN - 0.96  Add noon -- 0.98          Carb  Ratio      MN - 8           Correction      30           Target 120      He will try a temp basal of 80% during work hours to prevent the low            gvoke called in           Basaglar 30 units qhs - change to 27--stopped      Humalog 15 -20 ( 1unit per 8 grams )-stopped            Lipid Management                  Lab Results   Component Value Date     LDL 96 12/11/2018            At goal without statin      Blood Pressure Management:          Normal without bp meds           Microvascular Complication Monitoring:       Last eye exam -- march 2020 , no DR      -----------     Last Microalbumin-Proteinuria Assessment               Lab Results   Component Value Date     MALBCRERATIO <19.1 12/11/2018         No results found for: UTPCR     -----------        Neuropathy, not from diabetes     Has  carpal tunnel right hand not amenable to surgery        Neuropathy bilateral feet    Start gabapentin 100 mg one up to 3 at bedtime      [facility] controlled substance policy discussed.   Sumeet reviewed and appropriate        Preventive Care:          Smoking   Jesse Woodson  reports that he has been smoking cigarettes. He has never used smokeless tobacco.. I have educated him on the risk of diseases from using tobacco products such as cancer, COPD and heart disease.     I advised him to quit and he is not willing to quit.    I spent 3  minutes counseling the patient.                           Weight Related:      Patient's Body mass index is 21.12 kg/m². indicating that he is within normal range (BMI 18.5-24.9). No BMI management plan needed..                   Bone Health        Vitamin d def        Reassess         Thyroid Health                  Lab Results   Component Value Date     TSH 0.947 12/11/2018                        Other Diabetes Related Aspects                   Lab Results   Component Value Date     [id number] 769 12/11/2018               Last celiac panel                Lab Results   Component Value Date     GDPABIGA 4 12/11/2018     TTRANSGLIGA <2 12/11/2018      12/11/2018                      Follow Up {Instructions Charge Capture  Follow-up Communications :23}  Return in about 4 months (around 12/30/2021) for Recheck.  Patient was given instructions and counseling regarding his condition or for health maintenance advice. Please see specific information pulled into the AVS if appropriate.         This document has been electronically signed by TAM Oviedo on August 30, 2021 11:56 CDT.

## 2021-08-31 LAB
25(OH)D3 SERPL-MCNC: 32.2 NG/ML (ref 30–100)
ALBUMIN SERPL-MCNC: 4.8 G/DL (ref 3.5–5.2)
ALBUMIN UR-MCNC: <1.2 MG/DL
ALBUMIN/GLOB SERPL: 2.4 G/DL
ALP SERPL-CCNC: 95 U/L (ref 39–117)
ALT SERPL W P-5'-P-CCNC: 21 U/L (ref 1–41)
ANION GAP SERPL CALCULATED.3IONS-SCNC: 9 MMOL/L (ref 5–15)
AST SERPL-CCNC: 23 U/L (ref 1–40)
BASOPHILS # BLD AUTO: 0.03 10*3/MM3 (ref 0–0.2)
BASOPHILS NFR BLD AUTO: 0.5 % (ref 0–1.5)
BILIRUB SERPL-MCNC: 0.4 MG/DL (ref 0–1.2)
BUN SERPL-MCNC: 12 MG/DL (ref 6–20)
BUN/CREAT SERPL: 15.6 (ref 7–25)
CALCIUM SPEC-SCNC: 9.3 MG/DL (ref 8.6–10.5)
CHLORIDE SERPL-SCNC: 102 MMOL/L (ref 98–107)
CO2 SERPL-SCNC: 29 MMOL/L (ref 22–29)
CREAT SERPL-MCNC: 0.77 MG/DL (ref 0.76–1.27)
CREAT UR-MCNC: 35.7 MG/DL
CREAT UR-MCNC: 37.7 MG/DL
DEPRECATED RDW RBC AUTO: 42 FL (ref 37–54)
EOSINOPHIL # BLD AUTO: 0.07 10*3/MM3 (ref 0–0.4)
EOSINOPHIL NFR BLD AUTO: 1.2 % (ref 0.3–6.2)
ERYTHROCYTE [DISTWIDTH] IN BLOOD BY AUTOMATED COUNT: 12.2 % (ref 12.3–15.4)
GFR SERPL CREATININE-BSD FRML MDRD: 118 ML/MIN/1.73
GLOBULIN UR ELPH-MCNC: 2 GM/DL
GLUCOSE SERPL-MCNC: 143 MG/DL (ref 65–99)
HBA1C MFR BLD: 7.26 % (ref 4.8–5.6)
HCT VFR BLD AUTO: 44.2 % (ref 37.5–51)
HGB BLD-MCNC: 15.1 G/DL (ref 13–17.7)
IMM GRANULOCYTES # BLD AUTO: 0.01 10*3/MM3 (ref 0–0.05)
IMM GRANULOCYTES NFR BLD AUTO: 0.2 % (ref 0–0.5)
LYMPHOCYTES # BLD AUTO: 2.51 10*3/MM3 (ref 0.7–3.1)
LYMPHOCYTES NFR BLD AUTO: 44.3 % (ref 19.6–45.3)
MCH RBC QN AUTO: 31.8 PG (ref 26.6–33)
MCHC RBC AUTO-ENTMCNC: 34.2 G/DL (ref 31.5–35.7)
MCV RBC AUTO: 93.1 FL (ref 79–97)
MICROALBUMIN/CREAT UR: NORMAL MG/G{CREAT}
MONOCYTES # BLD AUTO: 0.39 10*3/MM3 (ref 0.1–0.9)
MONOCYTES NFR BLD AUTO: 6.9 % (ref 5–12)
NEUTROPHILS NFR BLD AUTO: 2.66 10*3/MM3 (ref 1.7–7)
NEUTROPHILS NFR BLD AUTO: 46.9 % (ref 42.7–76)
NRBC BLD AUTO-RTO: 0 /100 WBC (ref 0–0.2)
PLATELET # BLD AUTO: 256 10*3/MM3 (ref 140–450)
PMV BLD AUTO: 10 FL (ref 6–12)
POTASSIUM SERPL-SCNC: 4.4 MMOL/L (ref 3.5–5.2)
PROT SERPL-MCNC: 6.8 G/DL (ref 6–8.5)
PROT UR-MCNC: 5 MG/DL
PROT/CREAT UR: 132.6 MG/G CREA (ref 0–200)
RBC # BLD AUTO: 4.75 10*6/MM3 (ref 4.14–5.8)
SODIUM SERPL-SCNC: 140 MMOL/L (ref 136–145)
TSH SERPL DL<=0.05 MIU/L-ACNC: 1.31 UIU/ML (ref 0.27–4.2)
VIT B12 BLD-MCNC: 726 PG/ML (ref 211–946)
WBC # BLD AUTO: 5.67 10*3/MM3 (ref 3.4–10.8)

## 2021-09-01 ENCOUNTER — DOCUMENTATION (OUTPATIENT)
Dept: ENDOCRINOLOGY | Facility: CLINIC | Age: 31
End: 2021-09-01

## 2021-09-13 ENCOUNTER — DOCUMENTATION (OUTPATIENT)
Dept: ENDOCRINOLOGY | Facility: CLINIC | Age: 31
End: 2021-09-13

## 2021-09-13 RX ORDER — GLUCAGON 3 MG/1
1 POWDER NASAL AS NEEDED
Qty: 2 EACH | Refills: 11 | Status: SHIPPED | OUTPATIENT
Start: 2021-09-13

## 2021-09-15 ENCOUNTER — DOCUMENTATION (OUTPATIENT)
Dept: ENDOCRINOLOGY | Facility: CLINIC | Age: 31
End: 2021-09-15

## 2021-11-24 ENCOUNTER — DOCUMENTATION (OUTPATIENT)
Dept: ENDOCRINOLOGY | Facility: CLINIC | Age: 31
End: 2021-11-24

## 2022-03-02 ENCOUNTER — TELEMEDICINE (OUTPATIENT)
Dept: ENDOCRINOLOGY | Facility: CLINIC | Age: 32
End: 2022-03-02

## 2022-03-02 DIAGNOSIS — E55.9 VITAMIN D DEFICIENCY: Primary | ICD-10-CM

## 2022-03-02 DIAGNOSIS — E10.65 TYPE 1 DIABETES MELLITUS WITH HYPERGLYCEMIA: ICD-10-CM

## 2022-03-02 DIAGNOSIS — E10.43 DIABETIC AUTONOMIC NEUROPATHY ASSOCIATED WITH TYPE 1 DIABETES MELLITUS: ICD-10-CM

## 2022-03-02 DIAGNOSIS — F17.200 SMOKING: ICD-10-CM

## 2022-03-02 PROCEDURE — 99214 OFFICE O/P EST MOD 30 MIN: CPT | Performed by: NURSE PRACTITIONER

## 2022-03-02 RX ORDER — GABAPENTIN 100 MG/1
100 CAPSULE ORAL 3 TIMES DAILY
Qty: 90 CAPSULE | Refills: 5 | Status: SHIPPED | OUTPATIENT
Start: 2022-03-02 | End: 2023-03-02

## 2022-03-02 NOTE — PROGRESS NOTES
Chief Complaint  Diabetes    Subjective          Jesse Woodson presents to [facility] ENDOCRINOLOGY  History of Present Illness       You have chosen to receive care through a telehealth visit.  Do you consent to use a video/audio connection for your medical care today? Yes            TELEHEALTH VIDEO VISIT     This a video visit due to [facility] current guidelines for social distancing due to the COVID 19 pandemic      Primary provider Abhi Alejo DO      31 year old male presents for follow up      Reason diabetes mellitus type 1     Diagnosed at age 8      Timing constant     Quality not controlled     Severity high         History of DKA , hypoglycemia unawareness      Macrovascular complications PVD     Microvascular complications --neuropathy       Current diabetes regimen -- insulin , through tandem insulin pump            Current monitoring regimen: home blood tests - checking 4 x daily      Dexcom G6       could not download          Home blood sugar records:      On tandem insulin pump              Hypoglycemia unawareness, nocturnal, exertional , postprandial, if skipped meals , has had syncope  and has had ambulance and/or ER visits       Review of Systems - General ROS: negative           Objective   Vital Signs:   There were no vitals taken for this visit.    Physical Exam  Neurological:      General: No focal deficit present.      Mental Status: He is alert.   Psychiatric:         Mood and Affect: Mood normal.         Thought Content: Thought content normal.         Judgment: Judgment normal.        Result Review :   The following data was reviewed by: TAM Oviedo on 03/02/2022:  Common labs    Common Labsle 8/30/21 8/30/21 8/30/21 8/30/21    1151 1151 1151 1444   Glucose  143 (A)     BUN  12     Creatinine  0.77     eGFR Non African Am  118     Sodium  140     Potassium  4.4     Chloride  102     Calcium  9.3     Albumin  4.80     Total Bilirubin  0.4      Alkaline Phosphatase  95     AST (SGOT)  23     ALT (SGPT)  21     WBC 5.67      Hemoglobin 15.1      Hematocrit 44.2      Platelets 256      Hemoglobin A1C   7.26 (A)    Microalbumin, Urine    <1.2   (A) Abnormal value                      Assessment and Plan    Diagnoses and all orders for this visit:    1. Vitamin D deficiency (Primary)    2. Diabetic autonomic neuropathy associated with type 1 diabetes mellitus (HCC)  -     gabapentin (Neurontin) 100 MG capsule; Take 1 capsule by mouth 3 (Three) Times a Day.  Dispense: 90 capsule; Refill: 5    3. Type 1 diabetes mellitus with hyperglycemia (HCC)    4. Smoking    Other orders  -     Admelog 100 UNIT/ML injection; 100 units daily through pump, E10.65  Dispense: 30 mL; Refill: 11             Pt has type 1 diabetes with hypo and hyperglycemia         Glycemic Management:      Lab Results   Component Value Date    HGBA1C 7.26 (H) 08/30/2021                Dexcom but could not download      Now on tandem insulin pump      MN - 0.96  Add noon -- 0.98          Carb  Ratio      MN - 8           Correction      30            Target 120      He will try a temp basal of 80% during work hours to prevent the low            gvoke called in          previous regimen          Basaglar 30 units qhs - change to 27--stopped      Humalog 15 -20 ( 1unit per 8 grams )-stopped              Lipid Management                  Lab Results   Component Value Date     LDL 96 12/11/2018            At goal without statin      Blood Pressure Management:          Normal without bp meds           Microvascular Complication Monitoring:       Last eye exam -- march 2020 , no DR      -----------     Last Microalbumin-Proteinuria Assessment       Component      Latest Ref Rng & Units 8/30/2021 8/30/2021           2:44 PM  2:44 PM   Microalbumin/Creatinine Ratio           Creatinine, Urine      mg/dL 37.7 35.7   Microalbumin, Urine      mg/dL  <1.2   Protein/Creatinine Ratio      0.0 - 200.0 mg/G  Crea 132.6    Total Protein, Urine      mg/dL 5.0                Neuropathy, not from diabetes     Has carpal tunnel right hand not amenable to surgery         Neuropathy bilateral feet     Start gabapentin 100 mg one up to 3 at bedtime       [facility] controlled substance policy discussed.   Sumeet reviewed and appropriate        Preventive Care:          Smoking     Jesse Woodson  reports that he has been smoking cigarettes. He has never used smokeless tobacco.. I have educated him on the risk of diseases from using tobacco products such as cancer and COPD.     I advised him to quit and he is not willing to quit.    I spent 3  minutes counseling the patient.                                 Weight Related:      Patient's Body mass index is 21.12 kg/m². indicating that he is within normal range (BMI 18.5-24.9). No BMI management plan needed..                    Bone Health        Vitamin d def        Component      Latest Ref Rng & Units 8/30/2021   25 Hydroxy, Vitamin D      30.0 - 100.0 ng/ml 32.2             Thyroid Health        Lab Results   Component Value Date    TSH 1.310 08/30/2021                         Other Diabetes Related Aspects         Lab Results   Component Value Date    [id number] 726 08/30/2021             Last celiac panel                Lab Results   Component Value Date     GDPABIGA 4 12/11/2018     TTRANSGLIGA <2 12/11/2018      12/11/2018                         Follow Up   No follow-ups on file.  Patient was given instructions and counseling regarding his condition or for health maintenance advice. Please see specific information pulled into the AVS if appropriate.         This document has been electronically signed by TAM Oviedo on March 2, 2022 09:02 CST.

## 2022-06-03 ENCOUNTER — TELEMEDICINE (OUTPATIENT)
Dept: ENDOCRINOLOGY | Facility: CLINIC | Age: 32
End: 2022-06-03

## 2022-06-03 DIAGNOSIS — E55.9 VITAMIN D DEFICIENCY: ICD-10-CM

## 2022-06-03 DIAGNOSIS — E10.42 DIABETIC POLYNEUROPATHY ASSOCIATED WITH TYPE 1 DIABETES MELLITUS: ICD-10-CM

## 2022-06-03 DIAGNOSIS — F17.200 SMOKING: ICD-10-CM

## 2022-06-03 DIAGNOSIS — E10.65 TYPE 1 DIABETES MELLITUS WITH HYPERGLYCEMIA: Primary | ICD-10-CM

## 2022-06-03 PROCEDURE — 99214 OFFICE O/P EST MOD 30 MIN: CPT | Performed by: NURSE PRACTITIONER

## 2022-06-03 NOTE — PROGRESS NOTES
Chief Complaint  Diabetes    Subjective          Jesse Woodson presents to [facility] ENDOCRINOLOGY  History of Present Illness         You have chosen to receive care through a telehealth visit.  Do you consent to use a video/audio connection for your medical care today? Yes          TELEHEALTH VIDEO VISIT     This a video visit due to [facility] current guidelines for social distancing due to the COVID 19 pandemic    Primary provider Abhi Alejo DO      31 year old male presents for follow up      Reason diabetes mellitus type 1      Diagnosed at age 8      Timing constant     Quality not controlled     Severity high        History of DKA , hypoglycemia unawareness      Macrovascular complications PVD     Microvascular complications --neuropathy       Current diabetes regimen -- insulin , through tandem insulin pump             Current monitoring regimen: home blood tests - checking 4 x daily      Dexcom G6       could not download          Home blood sugar records:      On tandem insulin pump      States has been at goal     He is higher this week due to cold               Hypoglycemia unawareness, nocturnal, exertional , postprandial, if skipped meals , has had syncope  and has had ambulance and/or ER visits       Review of Systems - General ROS: negative          Objective   Vital Signs:   There were no vitals taken for this visit.    Physical Exam  Neurological:      General: No focal deficit present.      Mental Status: He is alert.   Psychiatric:         Mood and Affect: Mood normal.         Thought Content: Thought content normal.         Judgment: Judgment normal.        Result Review :   The following data was reviewed by: TAM Oviedo on 06/03/2022:  Common labs    Common Labsle 8/30/21 8/30/21 8/30/21 8/30/21    1151 1151 1151 1444   Glucose  143 (A)     BUN  12     Creatinine  0.77     eGFR Non African Am  118     Sodium  140     Potassium  4.4     Chloride  102      Calcium  9.3     Albumin  4.80     Total Bilirubin  0.4     Alkaline Phosphatase  95     AST (SGOT)  23     ALT (SGPT)  21     WBC 5.67      Hemoglobin 15.1      Hematocrit 44.2      Platelets 256      Hemoglobin A1C   7.26 (A)    Microalbumin, Urine    <1.2   (A) Abnormal value                      Assessment and Plan    Diagnoses and all orders for this visit:    1. Type 1 diabetes mellitus with hyperglycemia (HCC) (Primary)  -     CBC & Differential; Future  -     Comprehensive Metabolic Panel; Future  -     Hemoglobin A1c; Future  -     Lipid Panel; Future  -     Microalbumin / Creatinine Urine Ratio - Urine, Clean Catch; Future  -     TSH; Future  -     Vitamin D 25 Hydroxy; Future    2. Vitamin D deficiency  -     CBC & Differential; Future  -     Comprehensive Metabolic Panel; Future  -     Hemoglobin A1c; Future  -     Lipid Panel; Future  -     Microalbumin / Creatinine Urine Ratio - Urine, Clean Catch; Future  -     TSH; Future  -     Vitamin D 25 Hydroxy; Future    3. Smoking    4. Diabetic polyneuropathy associated with type 1 diabetes mellitus (HCC)           Pt has type 1 diabetes with hypo and hyperglycemia         Glycemic Management:            Lab Results   Component Value Date     HGBA1C 7.26 (H) 08/30/2021                  Dexcom but could not download      Now on tandem insulin pump      MN - 0.96  Add noon -- 0.98          Carb  Ratio      MN - 8           Correction      30            Target 120        No changes today per patient            gvoke called in          previous regimen            Basaglar 30 units qhs - change to 27--stopped      Humalog 15 -20 ( 1unit per 8 grams )-stopped              Lipid Management                  Lab Results   Component Value Date     LDL 96 12/11/2018            At goal without statin      Blood Pressure Management:          Normal without bp meds           Microvascular Complication Monitoring:       Last eye exam -- march 2020 , no        -----------     Last Microalbumin-Proteinuria Assessment        Component      Latest Ref Rng & Units 8/30/2021 8/30/2021           2:44 PM  2:44 PM   Microalbumin/Creatinine Ratio             Creatinine, Urine      mg/dL 37.7 35.7   Microalbumin, Urine      mg/dL   <1.2   Protein/Creatinine Ratio      0.0 - 200.0 mg/G Crea 132.6     Total Protein, Urine      mg/dL 5.0                    Neuropathy, not from diabetes     Has carpal tunnel right hand not amenable to surgery         Neuropathy bilateral feet      gabapentin 100 mg one up to 3 at bedtime       [facility] controlled substance policy discussed.   Sumeet reviewed and appropriate        Preventive Care:          Smoking      Jesse Woodson  reports that he has been smoking cigarettes. He has never used smokeless tobacco.. I have educated him on the risk of diseases from using tobacco products such as cancer and COPD.     I advised him to quit and he is not willing to quit.    I spent 3  minutes counseling the patient.                                              Bone Health        Vitamin d def        Component      Latest Ref Rng & Units 8/30/2021   25 Hydroxy, Vitamin D      30.0 - 100.0 ng/ml 32.2               Thyroid Health              Lab Results   Component Value Date     TSH 1.310 08/30/2021                           Other Diabetes Related Aspects               Lab Results   Component Value Date     [id number] 726 08/30/2021               Last celiac panel                Lab Results   Component Value Date     GDPABIGA 4 12/11/2018     TTRANSGLIGA <2 12/11/2018      12/11/2018                             Follow Up   No follow-ups on file.  Patient was given instructions and counseling regarding his condition or for health maintenance advice. Please see specific information pulled into the AVS if appropriate.         This document has been electronically signed by TAM Oviedo on Andria 3, 2022 10:52 CDT.

## 2022-08-17 DIAGNOSIS — E10.65 TYPE 1 DIABETES MELLITUS WITH HYPERGLYCEMIA: ICD-10-CM

## 2022-08-17 DIAGNOSIS — E55.9 VITAMIN D DEFICIENCY: ICD-10-CM

## 2022-08-23 DIAGNOSIS — E10.65 TYPE 1 DIABETES MELLITUS WITH HYPERGLYCEMIA: ICD-10-CM

## 2022-08-23 DIAGNOSIS — E55.9 VITAMIN D DEFICIENCY: ICD-10-CM

## 2022-09-06 ENCOUNTER — TELEMEDICINE (OUTPATIENT)
Dept: ENDOCRINOLOGY | Facility: CLINIC | Age: 32
End: 2022-09-06

## 2022-09-06 DIAGNOSIS — E55.9 VITAMIN D DEFICIENCY: ICD-10-CM

## 2022-09-06 DIAGNOSIS — E10.649 TYPE 1 DIABETES MELLITUS WITH HYPOGLYCEMIA AND WITHOUT COMA: Primary | ICD-10-CM

## 2022-09-06 DIAGNOSIS — F17.200 SMOKING: ICD-10-CM

## 2022-09-06 DIAGNOSIS — E10.42 DIABETIC POLYNEUROPATHY ASSOCIATED WITH TYPE 1 DIABETES MELLITUS: ICD-10-CM

## 2022-09-06 LAB — HBA1C MFR BLD: 7.9 %

## 2022-09-06 PROCEDURE — 99214 OFFICE O/P EST MOD 30 MIN: CPT | Performed by: NURSE PRACTITIONER

## 2022-09-06 NOTE — PROGRESS NOTES
Chief Complaint  Diabetes    Subjective          Jesse Woodson presents to [facility] ENDOCRINOLOGY  History of Present Illness           You have chosen to receive care through a telehealth visit.  Do you consent to use a video/audio connection for your medical care today? Yes          TELEHEALTH VIDEO VISIT     This a video visit due to [facility] current guidelines for social distancing due to the COVID 19 pandemic    Primary provider Abhi Alejo DO      32 year old male presents for follow up      Reason diabetes mellitus type 1      Diagnosed at age 8      Timing constant     Quality not controlled     Severity high        History of DKA , hypoglycemia unawareness      Macrovascular complications PVD     Microvascular complications --neuropathy       Current diabetes regimen -- insulin , through tandem insulin pump     Current monitoring regimen: home blood tests - checking 4 x daily      Dexcom G6       could not download          Home blood sugar records:      On tandem insulin pump       States some lows with activity               Hypoglycemia unawareness, nocturnal, exertional , postprandial, if skipped meals , has had syncope  and has had ambulance and/or ER visits       Review of Systems - General ROS: negative          Objective   Vital Signs:   There were no vitals taken for this visit.    Physical Exam  Neurological:      General: No focal deficit present.      Mental Status: He is alert.   Psychiatric:         Mood and Affect: Mood normal.         Thought Content: Thought content normal.         Judgment: Judgment normal.        Result Review :   The following data was reviewed by: TAM Oviedo on 06/03/2022:  Common labs    Common Labsle 8/12/22   Hemoglobin A1C 7.9                       Assessment and Plan    Diagnoses and all orders for this visit:    1. Type 1 diabetes mellitus with hypoglycemia and without coma (HCC) (Primary)    2. Diabetic polyneuropathy  associated with type 1 diabetes mellitus (HCC)    3. Vitamin D deficiency    4. Smoking    Other orders  -     HumaLOG 100 UNIT/ML injection; 90 units daily , E11.649  Dispense: 30 mL; Refill: 12           Pt has type 1 diabetes with hypo and hyperglycemia         Glycemic Management:      Lab Results   Component Value Date    HGBA1C 7.9 08/12/2022          Dexcom but could not download     admelog not covered changed to humalog      Now on tandem insulin pump ---needs control IQ      MN - 0.96   noon -- 0.98          Carb  Ratio      MN - 8           Correction      30            Target 120        Did not want changes to pump     He will run a temp basal of 80% with activity            gvoke called in          previous regimen            Basaglar 30 units qhs - change to 27--stopped      Humalog 15 -20 ( 1unit per 8 grams )-stopped              Lipid Management                  Lab Results   Component Value Date     LDL 96 12/11/2018            At goal without statin      Blood Pressure Management:          Normal without bp meds           Microvascular Complication Monitoring:       Last eye exam -- march 2020 , no DR      -----------     Last Microalbumin-Proteinuria Assessment        Component      Latest Ref Rng & Units 8/30/2021 8/30/2021           2:44 PM  2:44 PM   Microalbumin/Creatinine Ratio             Creatinine, Urine      mg/dL 37.7 35.7   Microalbumin, Urine      mg/dL   <1.2   Protein/Creatinine Ratio      0.0 - 200.0 mg/G Crea 132.6     Total Protein, Urine      mg/dL 5.0                    Neuropathy, not from diabetes     Has carpal tunnel right hand not amenable to surgery         Neuropathy bilateral feet      gabapentin 100 mg one up to 3 at bedtime       [facility] controlled substance policy discussed.   Sumeet reviewed and appropriate        Preventive Care:          Smoking      Jesse Woodson  reports that he has been smoking cigarettes. He has never used smokeless tobacco.. I have  educated him on the risk of diseases from using tobacco products such as cancer and COPD.     I advised him to quit and he is not willing to quit.    I spent 3  minutes counseling the patient.            Bone Health        Vitamin d def        Component      Latest Ref Rng & Units 8/30/2021   25 Hydroxy, Vitamin D      30.0 - 100.0 ng/ml 32.2               Thyroid Health              Lab Results   Component Value Date     TSH 1.310 08/30/2021                           Other Diabetes Related Aspects               Lab Results   Component Value Date     [id number] 726 08/30/2021               Last celiac panel                Lab Results   Component Value Date     GDPABIGA 4 12/11/2018     TTRANSGLIGA <2 12/11/2018      12/11/2018                             Follow Up   No follow-ups on file.  Patient was given instructions and counseling regarding his condition or for health maintenance advice. Please see specific information pulled into the AVS if appropriate.         This document has been electronically signed by TAM Oviedo on September 6, 2022 11:31 CDT.

## 2022-09-12 ENCOUNTER — TELEPHONE (OUTPATIENT)
Dept: ENDOCRINOLOGY | Facility: CLINIC | Age: 32
End: 2022-09-12

## 2022-09-12 NOTE — TELEPHONE ENCOUNTER
Tried calling pt to schedule 3 month follow up with Wes. No answer. If pt calls back, let them know I have them scheduled for a virtual follow up with Wes on Thursday, Jan 5 at 11:30. I have also mailed an appt reminder.

## 2022-09-15 ENCOUNTER — DOCUMENTATION (OUTPATIENT)
Dept: ENDOCRINOLOGY | Facility: CLINIC | Age: 32
End: 2022-09-15

## 2022-09-21 ENCOUNTER — DOCUMENTATION (OUTPATIENT)
Dept: ENDOCRINOLOGY | Facility: CLINIC | Age: 32
End: 2022-09-21

## 2022-11-10 ENCOUNTER — DOCUMENTATION (OUTPATIENT)
Dept: ENDOCRINOLOGY | Facility: CLINIC | Age: 32
End: 2022-11-10

## 2023-01-05 ENCOUNTER — TELEMEDICINE (OUTPATIENT)
Dept: ENDOCRINOLOGY | Facility: CLINIC | Age: 33
End: 2023-01-05
Payer: MEDICAID

## 2023-01-05 DIAGNOSIS — F17.200 SMOKER: ICD-10-CM

## 2023-01-05 DIAGNOSIS — E10.65 TYPE 1 DIABETES MELLITUS WITH HYPERGLYCEMIA: Primary | ICD-10-CM

## 2023-01-05 DIAGNOSIS — E10.42 DIABETIC POLYNEUROPATHY ASSOCIATED WITH TYPE 1 DIABETES MELLITUS: ICD-10-CM

## 2023-01-05 PROCEDURE — 99214 OFFICE O/P EST MOD 30 MIN: CPT | Performed by: NURSE PRACTITIONER

## 2023-01-05 NOTE — PROGRESS NOTES
Chief Complaint  No chief complaint on file.    Subjective          Jesse Woodson presents to [facility] ENDOCRINOLOGY  History of Present Illness           You have chosen to receive care through a telehealth visit.  Do you consent to use a video/audio connection for your medical care today? Yes          TELEHEALTH VIDEO VISIT     This a video visit due to [facility] current guidelines for social distancing due to the COVID 19 pandemic      Mode of Visit: Video  Location of patient: home  You have chosen to receive care through a telehealth visit.  The patient has signed the video visit consent form.  The visit included audio and video interaction. No technical issues occurred during this visit.           Primary provider Abhi Alejo DO      32 year old male presents for follow up      Reason diabetes mellitus type 1      Diagnosed at age 8      Timing constant     Quality not controlled     Severity high       History of DKA , hypoglycemia unawareness      Macrovascular complications PVD     Microvascular complications --neuropathy       Current diabetes regimen -- insulin , through tandem insulin pump     Current monitoring regimen: home blood tests - checking 4 x daily     Uses dexcom G6     Could not download           Home blood sugar records:         On tandem insulin pump       States doing good     Has rare lows                       Hypoglycemia unawareness, nocturnal, exertional , postprandial, if skipped meals , has had syncope  and has had ambulance and/or ER visits       Review of Systems - General ROS: negative          Objective   Vital Signs:   There were no vitals taken for this visit.    Physical Exam  Neurological:      General: No focal deficit present.      Mental Status: He is alert.   Psychiatric:         Mood and Affect: Mood normal.         Thought Content: Thought content normal.         Judgment: Judgment normal.        Result Review :   The following data was  reviewed by: TAM Oviedo on 06/03/2022:  Common labs    Common Labs 8/12/22   Hemoglobin A1C 7.9                       Assessment and Plan    Diagnoses and all orders for this visit:    1. Type 1 diabetes mellitus with hyperglycemia (HCC) (Primary)    2. Smoker    3. Diabetic polyneuropathy associated with type 1 diabetes mellitus (HCC)    Other orders  -     HumaLOG 100 UNIT/ML injection; 90 units daily , E11.649  Dispense: 30 mL; Refill: 12           Pt has type 1 diabetes with hypo and hyperglycemia         Glycemic Management:      Lab Results   Component Value Date    HGBA1C 7.9 08/12/2022          Dexcom but could not download     admelog not covered changed to humalog      Now on tandem insulin pump ---needs control IQ     Called tandem again to reach out to him to set up control iq      MN - 0.96   noon -- 0.98          Carb  Ratio      MN - 8           Correction      30            Target 120        Did not want changes to pump     He will run a temp basal of 80% with activity            gvoke called in          previous regimen            Basaglar 30 units qhs - change to 27--stopped      Humalog 15 -20 ( 1unit per 8 grams )-stopped              Lipid Management                  Lab Results   Component Value Date     LDL 96 12/11/2018            At goal without statin      Blood Pressure Management:          Normal without bp meds           Microvascular Complication Monitoring:       Last eye exam -- march 2020 , no DR      -----------     Last Microalbumin-Proteinuria Assessment        Component      Latest Ref Rng & Units 8/30/2021 8/30/2021           2:44 PM  2:44 PM   Microalbumin/Creatinine Ratio             Creatinine, Urine      mg/dL 37.7 35.7   Microalbumin, Urine      mg/dL   <1.2   Protein/Creatinine Ratio      0.0 - 200.0 mg/G Crea 132.6     Total Protein, Urine      mg/dL 5.0                    Neuropathy, not from diabetes     Has carpal tunnel right hand not amenable to  surgery         Neuropathy bilateral feet      gabapentin 100 mg one up to 3 at bedtime       [facility] controlled substance policy discussed.   Sumeet reviewed and appropriate        Preventive Care:          Smoking      Jesse Woodson  reports that he has been smoking cigarettes. He has never used smokeless tobacco.. I have educated him on the risk of diseases from using tobacco products such as cancer and COPD.     I advised him to quit and he is not willing to quit.    I spent 3  minutes counseling the patient.            Bone Health        Vitamin d def        Component      Latest Ref Rng & Units 8/30/2021   25 Hydroxy, Vitamin D      30.0 - 100.0 ng/ml 32.2               Thyroid Health              Lab Results   Component Value Date     TSH 1.310 08/30/2021                           Other Diabetes Related Aspects               Lab Results   Component Value Date     [id number] 726 08/30/2021               Last celiac panel                Lab Results   Component Value Date     GDPABIGA 4 12/11/2018     TTRANSGLIGA <2 12/11/2018      12/11/2018                             Follow Up   No follow-ups on file.  Patient was given instructions and counseling regarding his condition or for health maintenance advice. Please see specific information pulled into the AVS if appropriate.         This document has been electronically signed by ATM Oviedo on January 5, 2023 11:43 CST.

## 2023-01-06 ENCOUNTER — DOCUMENTATION (OUTPATIENT)
Dept: ENDOCRINOLOGY | Facility: CLINIC | Age: 33
End: 2023-01-06
Payer: MEDICAID

## 2023-01-25 ENCOUNTER — DOCUMENTATION (OUTPATIENT)
Dept: ENDOCRINOLOGY | Facility: CLINIC | Age: 33
End: 2023-01-25
Payer: MEDICAID

## 2023-03-28 ENCOUNTER — DOCUMENTATION (OUTPATIENT)
Dept: ENDOCRINOLOGY | Facility: CLINIC | Age: 33
End: 2023-03-28
Payer: MEDICAID

## 2023-04-18 ENCOUNTER — TELEMEDICINE (OUTPATIENT)
Dept: ENDOCRINOLOGY | Facility: CLINIC | Age: 33
End: 2023-04-18
Payer: MEDICAID

## 2023-04-18 DIAGNOSIS — E10.42 DIABETIC POLYNEUROPATHY ASSOCIATED WITH TYPE 1 DIABETES MELLITUS: ICD-10-CM

## 2023-04-18 DIAGNOSIS — E10.65 TYPE 1 DIABETES MELLITUS WITH HYPERGLYCEMIA: Primary | ICD-10-CM

## 2023-04-18 DIAGNOSIS — F17.200 SMOKER: ICD-10-CM

## 2023-04-18 NOTE — PROGRESS NOTES
Chief Complaint  Diabetes mellitus       Subjective          Jesse Woodson presents to [facility] ENDOCRIN[facility]  History of Present Illness           You have chosen to receive care through a telehealth visit.  Do you consent to use a video/audio connection for your medical care today? Yes          TELEHEALTH VIDEO VISIT     This a video visit due to [facility] current guidelines for social distancing due to the COVID 19 pandemic      Mode of Visit: Video  Location of patient: home  You have chosen to receive care through a telehealth visit.  The patient has signed the video visit consent form.  The visit included audio and video interaction. No technical issues occurred during this visit.           Primary provider Abhi Alejo DO     32 year old male presents for follow up     Diabetes mellitus type 1    Duration since age 8     Timing constant     Not controlled     Severity high due to history of DKA and hypoglycemia unwareness                Microvascular complications --neuropathy       Current diabetes regimen --         insulin , through tandem insulin pump     Not in Control IQ         Current monitoring regimen: home blood tests -       checking 4 x daily     Uses dexcom G6     Could not download         States doing good     Rare lows on dexcom     History of severe hypoglycemia     Review of Systems - General ROS: negative          Objective   Vital Signs:   There were no vitals taken for this visit.    Physical Exam  Neurological:      General: No focal deficit present.      Mental Status: He is alert.   Psychiatric:         Mood and Affect: Mood normal.         Thought Content: Thought content normal.         Judgment: Judgment normal.        Result Review :   The following data was reviewed by: TAM Oviedo on 06/03/2022:  Common labs        8/12/2022    00:00   Common Labs   Hemoglobin A1C 7.9            This result is from an external source.                  Assessment and Plan    Diagnoses and all orders for this visit:    1. Type 1 diabetes mellitus with hyperglycemia (Primary)    2. Diabetic polyneuropathy associated with type 1 diabetes mellitus    3. Smoker    Other orders  -     HumaLOG 100 UNIT/ML injection; 90 units daily , E11.649  Dispense: 30 mL; Refill: 12           Pt has type 1 diabetes with hypo and hyperglycemia         Glycemic Management:           Dexcom but could not download     admelog not covered changed to humalog      Now on tandem insulin pump ---needs control IQ     Called tandem again to reach out to him to set up control iq      MN - 0.96   noon -- 0.98          Carb  Ratio      MN - 8           Correction      30            Target 120        Did not want changes to pump     He will run a temp basal of 80% with activity            gvoke called in          previous regimen            Basaglar 30 units qhs - change to 27--stopped      Humalog 15 -20 ( 1unit per 8 grams )-stopped              Lipid Management                  Lab Results   Component Value Date     LDL 96 12/11/2018            At goal without statin      Blood Pressure Management:          Normal without bp meds           Microvascular Complication Monitoring:       Last eye exam -- march 2020 , no DR      -----------             Neg protein in urine          Has carpal tunnel right hand not amenable to surgery         Neuropathy bilateral feet      gabapentin 100 mg one up to 3 at bedtime       [facility] controlled substance policy discussed.   Sumeet reviewed and appropriate        Preventive Care:          Smoking      Jesse Woodson  reports that he has been smoking cigarettes. He has never used smokeless tobacco.. I have educated him on the risk of diseases from using tobacco products such as cancer and COPD.     I advised him to quit and he is not willing to quit.    I spent 3  minutes counseling the patient.            Bone Health        Vitamin d  def        Component      Latest Ref Rng & Units 8/30/2021   25 Hydroxy, Vitamin D      30.0 - 100.0 ng/ml 32.2               Thyroid Health              Lab Results   Component Value Date     TSH 1.310 08/30/2021                           Other Diabetes Related Aspects               Lab Results   Component Value Date     [id number] 726 08/30/2021               Last celiac panel                Lab Results   Component Value Date     GDPABIGA 4 12/11/2018     TTRANSGLIGA <2 12/11/2018      12/11/2018                             Follow Up   No follow-ups on file.  Patient was given instructions and counseling regarding his condition or for health maintenance advice. Please see specific information pulled into the AVS if appropriate.         This document has been electronically signed by TAM Oviedo on April 18, 2023 14:25 CDT.

## 2023-08-02 ENCOUNTER — TELEMEDICINE (OUTPATIENT)
Dept: ENDOCRINOLOGY | Facility: CLINIC | Age: 33
End: 2023-08-02
Payer: COMMERCIAL

## 2023-08-02 DIAGNOSIS — E10.65 TYPE 1 DIABETES MELLITUS WITH HYPERGLYCEMIA: Primary | ICD-10-CM

## 2023-08-02 DIAGNOSIS — E55.9 VITAMIN D DEFICIENCY: ICD-10-CM

## 2023-08-02 DIAGNOSIS — E10.43 DIABETIC AUTONOMIC NEUROPATHY ASSOCIATED WITH TYPE 1 DIABETES MELLITUS: ICD-10-CM

## 2023-08-02 DIAGNOSIS — F17.200 SMOKING: ICD-10-CM

## 2023-09-08 ENCOUNTER — DOCUMENTATION (OUTPATIENT)
Dept: ENDOCRINOLOGY | Facility: CLINIC | Age: 33
End: 2023-09-08
Payer: COMMERCIAL

## 2025-04-07 ENCOUNTER — APPOINTMENT (OUTPATIENT)
Dept: GENERAL RADIOLOGY | Facility: HOSPITAL | Age: 35
End: 2025-04-07
Payer: COMMERCIAL

## 2025-04-07 PROCEDURE — 73630 X-RAY EXAM OF FOOT: CPT

## 2025-04-08 ENCOUNTER — TELEPHONE (OUTPATIENT)
Age: 35
End: 2025-04-08
Payer: COMMERCIAL

## 2025-04-08 DIAGNOSIS — M79.672 LEFT FOOT PAIN: Primary | ICD-10-CM

## 2025-04-08 NOTE — TELEPHONE ENCOUNTER
Patient informed of X-ray order by Ramesh Packer PA-C at [facility]. Patient to arrive 30 minutes before appointment at [address] outpatient lab and imaging.

## 2025-04-14 ENCOUNTER — OFFICE VISIT (OUTPATIENT)
Age: 35
End: 2025-04-14
Payer: COMMERCIAL

## 2025-04-14 ENCOUNTER — HOSPITAL ENCOUNTER (OUTPATIENT)
Dept: GENERAL RADIOLOGY | Facility: HOSPITAL | Age: 35
Discharge: HOME | End: 2025-04-14
Admitting: PHYSICIAN ASSISTANT
Payer: COMMERCIAL

## 2025-04-14 VITALS — BODY MASS INDEX: 28.31 KG/M2 | RESPIRATION RATE: 18 BRPM | WEIGHT: 209 LBS | HEIGHT: 72 IN

## 2025-04-14 DIAGNOSIS — S92.512A DISPLACED FRACTURE OF PROXIMAL PHALANX OF LEFT LESSER TOE(S), INITIAL ENCOUNTER FOR CLOSED FRACTURE: Primary | ICD-10-CM

## 2025-04-14 DIAGNOSIS — M79.672 LEFT FOOT PAIN: ICD-10-CM

## 2025-04-14 PROCEDURE — 99204 OFFICE O/P NEW MOD 45 MIN: CPT | Performed by: PHYSICIAN ASSISTANT

## 2025-04-14 PROCEDURE — 73630 X-RAY EXAM OF FOOT: CPT

## 2025-04-14 NOTE — PROGRESS NOTES
[facility]  Dimas Packer MD, PhD  Ramesh Packer PA-C    Chief Complaint:   Chief Complaint   Patient presents with    Left Foot - Initial Evaluation     Patient presents today for left foot pain for two weeks, patient fell. X-rays performed at [facility] on 04/07/2025 and 04/14/2025. Patient complains of pain near 3rd metatarsal.          History of Present Illness:   The patient is a pleasant 34-year-old who presents with left foot pain.  He had a fall approximately 2 weeks prior.  He was seen at [facility] urgent care where x-rays were taken which demonstrated a proximal phalanx fracture involving the third digit.  He was subsequently placed in a stiff soled shoe.  He has having some pain with ambulating as well as swelling.  He works at a local [employer] in a physically active role.  He states his pain is relatively well-controlled at this time.  He denies any loss of sensation, motor function, or constitutional symptoms.  He denies any prior surgical intervention to his left foot.    Referring Provider: Ayaka Mckeon APRN     Past Medical History:   Past Medical History:   Diagnosis Date    Bipolar depression 12/7/2018    Type 1 diabetes mellitus with hypoglycemia unawareness 12/7/2018        Past Surgical History:  No past surgical history on file.     Social History:   Social History     Socioeconomic History    Marital status: Single   Tobacco Use    Smoking status: Some Days     Types: Cigarettes    Smokeless tobacco: Never    Tobacco comments:     occasional 8/30/2021   Vaping Use    Vaping status: Never Used   Substance and Sexual Activity    Alcohol use: No    Drug use: No    Sexual activity: Defer        Medications:  Current Outpatient Medications   Medication Instructions    amphetamine-dextroamphetamine (ADDERALL) 30 MG tablet 30 mg, 2 Times Daily    amphetamine-dextroamphetamine XR (ADDERALL XR) 30 MG 24 hr capsule 15 capsules, 2 Times Daily    Glucagon  (Baqsimi One Pack) 3 MG/DOSE powder 1 each, Nasal, As Needed, Apply intranasal if hypoglycemia    glucagon (GLUCAGEN) 1 mg, Subcutaneous, Once As Needed    Insulin Lispro 100 UNIT/ML solution cartridge 90 units daily    lamoTRIgine (LAMICTAL) 200 mg, Daily    Latuda 20 mg, Oral, Daily        Allergies:  No Known Allergies    Vital Signs:  Vitals:    04/14/25 1334   Resp: 18     Body mass index is 28.35 kg/m².     Review of Systems:   Review of Systems    Physical Exam:  Vital signs reviewed.   General: No acute distress. Cooperative with exam.   Eyes: conjunctiva clear; pupils equally round and reactive  ENT: external ears and nose atraumatic; oropharynx clear  CV: no peripheral edema, 2+ distal pulses  Resp: normal respiratory effort, no use of accessory muscles  Skin: no rashes or wounds; normal turgor  Psych: mood and affect appropriate; recent and remote memory intact  Neuro: sensation to light touch intact, no focal neurological deficit  Musculoskeletal: On inspection of the patient's left foot he does have swelling primarily along the dorsum of the forefoot.  He is tender to palpation along the 2nd, 3rd and 4th MTP joints as well as the proximal phalanx of the third digit.  Negative calcaneal compression test.  Nontender along the Lisfranc.  Neurovascular intact left lower extremity.  He does walk with an antalgic gait    Physical Exam     Results Review:   XR --  XR - 1 Result   I have personally reviewed Results for orders placed during the hospital encounter of 04/14/25    XR FOOT 3+ VW LEFT 4/14/2025   and my interpretation of the image is as follows: I have reviewed an AP oblique and lateral of the patient's left foot which demonstrates a minimally displaced proximal phalanx third digit fracture that appears to extend into the articular surface.      This was in comparison to prior x-rays, to include a left foot AP oblique and lateral, which were also reviewed and interpreted by me which demonstrated a  similar appearing fracture.  No significant interval displacement is present.  Image quality is adequate      Assessment:   Diagnoses and all orders for this visit:    1. Displaced fracture of proximal phalanx of left lesser toe(s), initial encounter for closed fracture (Primary)  -     Miscellaneous DME       Plan:  At this point we will continue nonoperative management.  Will transition the patient from a postop shoe into a short walking boot to protect and immobilize the area as well as allow him to continue working safely.  He will continue ice, compression, elevation as needed.  He will continue OTC NSAIDs and Tylenol for pain control.  May consider repeat x-rays in approximately 3 to 4 weeks if necessary if pain persist but otherwise have given the patient to go ahead to start to wean out of the walking boot into a supportive shoe in approximately 2 to 3 weeks as pain allows.  Patient is agreeable with this plan, all questions were answered    Follow-Up:   Return if symptoms worsen or fail to improve.     Procedure:  Procedures     Ramesh Packer PA-C